# Patient Record
Sex: FEMALE | Race: WHITE | NOT HISPANIC OR LATINO | Employment: OTHER | URBAN - METROPOLITAN AREA
[De-identification: names, ages, dates, MRNs, and addresses within clinical notes are randomized per-mention and may not be internally consistent; named-entity substitution may affect disease eponyms.]

---

## 2018-12-15 ENCOUNTER — APPOINTMENT (OUTPATIENT)
Dept: RADIOLOGY | Facility: MEDICAL CENTER | Age: 73
DRG: 470 | End: 2018-12-15
Attending: EMERGENCY MEDICINE
Payer: MEDICARE

## 2018-12-15 ENCOUNTER — HOSPITAL ENCOUNTER (INPATIENT)
Facility: MEDICAL CENTER | Age: 73
LOS: 11 days | DRG: 470 | End: 2018-12-26
Attending: EMERGENCY MEDICINE | Admitting: HOSPITALIST
Payer: MEDICARE

## 2018-12-15 DIAGNOSIS — Z72.0 TOBACCO ABUSE: ICD-10-CM

## 2018-12-15 DIAGNOSIS — F19.10 POLYSUBSTANCE ABUSE (HCC): ICD-10-CM

## 2018-12-15 DIAGNOSIS — D75.89 MACROCYTOSIS WITHOUT ANEMIA: ICD-10-CM

## 2018-12-15 DIAGNOSIS — F40.01 AGORAPHOBIA WITH PANIC DISORDER: ICD-10-CM

## 2018-12-15 PROBLEM — S72.002A CLOSED FRACTURE OF NECK OF LEFT FEMUR (HCC): Status: ACTIVE | Noted: 2018-12-15

## 2018-12-15 PROBLEM — E66.3 OVERWEIGHT (BMI 25.0-29.9): Status: ACTIVE | Noted: 2018-12-15

## 2018-12-15 LAB
ALBUMIN SERPL BCP-MCNC: 3.9 G/DL (ref 3.2–4.9)
ALBUMIN/GLOB SERPL: 1.3 G/DL
ALP SERPL-CCNC: 120 U/L (ref 30–99)
ALT SERPL-CCNC: 18 U/L (ref 2–50)
ANION GAP SERPL CALC-SCNC: 12 MMOL/L (ref 0–11.9)
APTT PPP: 28.5 SEC (ref 24.7–36)
AST SERPL-CCNC: 26 U/L (ref 12–45)
BASOPHILS # BLD AUTO: 0.2 % (ref 0–1.8)
BASOPHILS # BLD: 0.05 K/UL (ref 0–0.12)
BILIRUB SERPL-MCNC: 1.3 MG/DL (ref 0.1–1.5)
BUN SERPL-MCNC: 15 MG/DL (ref 8–22)
CALCIUM SERPL-MCNC: 9.6 MG/DL (ref 8.5–10.5)
CHLORIDE SERPL-SCNC: 99 MMOL/L (ref 96–112)
CO2 SERPL-SCNC: 26 MMOL/L (ref 20–33)
CREAT SERPL-MCNC: 1.11 MG/DL (ref 0.5–1.4)
EOSINOPHIL # BLD AUTO: 0 K/UL (ref 0–0.51)
EOSINOPHIL NFR BLD: 0 % (ref 0–6.9)
ERYTHROCYTE [DISTWIDTH] IN BLOOD BY AUTOMATED COUNT: 51.8 FL (ref 35.9–50)
GLOBULIN SER CALC-MCNC: 3.1 G/DL (ref 1.9–3.5)
GLUCOSE SERPL-MCNC: 136 MG/DL (ref 65–99)
HCT VFR BLD AUTO: 48.8 % (ref 37–47)
HGB BLD-MCNC: 16.9 G/DL (ref 12–16)
IMM GRANULOCYTES # BLD AUTO: 0.11 K/UL (ref 0–0.11)
IMM GRANULOCYTES NFR BLD AUTO: 0.5 % (ref 0–0.9)
INR PPP: 1.13 (ref 0.87–1.13)
LYMPHOCYTES # BLD AUTO: 0.93 K/UL (ref 1–4.8)
LYMPHOCYTES NFR BLD: 4.6 % (ref 22–41)
MCH RBC QN AUTO: 33.5 PG (ref 27–33)
MCHC RBC AUTO-ENTMCNC: 34.6 G/DL (ref 33.6–35)
MCV RBC AUTO: 96.8 FL (ref 81.4–97.8)
MONOCYTES # BLD AUTO: 1.35 K/UL (ref 0–0.85)
MONOCYTES NFR BLD AUTO: 6.7 % (ref 0–13.4)
NEUTROPHILS # BLD AUTO: 17.58 K/UL (ref 2–7.15)
NEUTROPHILS NFR BLD: 88 % (ref 44–72)
NRBC # BLD AUTO: 0 K/UL
NRBC BLD-RTO: 0 /100 WBC
PLATELET # BLD AUTO: 149 K/UL (ref 164–446)
PMV BLD AUTO: 10.7 FL (ref 9–12.9)
POTASSIUM SERPL-SCNC: 3.3 MMOL/L (ref 3.6–5.5)
PROT SERPL-MCNC: 7 G/DL (ref 6–8.2)
PROTHROMBIN TIME: 14.6 SEC (ref 12–14.6)
RBC # BLD AUTO: 5.04 M/UL (ref 4.2–5.4)
SODIUM SERPL-SCNC: 137 MMOL/L (ref 135–145)
TROPONIN I SERPL-MCNC: 0.01 NG/ML (ref 0–0.04)
WBC # BLD AUTO: 20 K/UL (ref 4.8–10.8)

## 2018-12-15 PROCEDURE — 84484 ASSAY OF TROPONIN QUANT: CPT

## 2018-12-15 PROCEDURE — 73551 X-RAY EXAM OF FEMUR 1: CPT | Mod: LT

## 2018-12-15 PROCEDURE — 96375 TX/PRO/DX INJ NEW DRUG ADDON: CPT

## 2018-12-15 PROCEDURE — 96374 THER/PROPH/DIAG INJ IV PUSH: CPT

## 2018-12-15 PROCEDURE — 71045 X-RAY EXAM CHEST 1 VIEW: CPT

## 2018-12-15 PROCEDURE — 85730 THROMBOPLASTIN TIME PARTIAL: CPT

## 2018-12-15 PROCEDURE — 85610 PROTHROMBIN TIME: CPT

## 2018-12-15 PROCEDURE — 700105 HCHG RX REV CODE 258: Performed by: EMERGENCY MEDICINE

## 2018-12-15 PROCEDURE — 72170 X-RAY EXAM OF PELVIS: CPT

## 2018-12-15 PROCEDURE — 99223 1ST HOSP IP/OBS HIGH 75: CPT | Mod: AI | Performed by: HOSPITALIST

## 2018-12-15 PROCEDURE — 99285 EMERGENCY DEPT VISIT HI MDM: CPT

## 2018-12-15 PROCEDURE — 700111 HCHG RX REV CODE 636 W/ 250 OVERRIDE (IP): Performed by: EMERGENCY MEDICINE

## 2018-12-15 PROCEDURE — 770001 HCHG ROOM/CARE - MED/SURG/GYN PRIV*

## 2018-12-15 PROCEDURE — 85025 COMPLETE CBC W/AUTO DIFF WBC: CPT

## 2018-12-15 PROCEDURE — 93005 ELECTROCARDIOGRAM TRACING: CPT | Performed by: EMERGENCY MEDICINE

## 2018-12-15 PROCEDURE — 80053 COMPREHEN METABOLIC PANEL: CPT

## 2018-12-15 RX ORDER — VALSARTAN 80 MG/1
160 TABLET ORAL DAILY
Status: DISCONTINUED | OUTPATIENT
Start: 2018-12-16 | End: 2018-12-26 | Stop reason: HOSPADM

## 2018-12-15 RX ORDER — ACETAMINOPHEN 325 MG/1
650 TABLET ORAL EVERY 6 HOURS PRN
Status: DISCONTINUED | OUTPATIENT
Start: 2018-12-15 | End: 2018-12-20

## 2018-12-15 RX ORDER — MORPHINE SULFATE 10 MG/ML
4 INJECTION, SOLUTION INTRAMUSCULAR; INTRAVENOUS ONCE
Status: COMPLETED | OUTPATIENT
Start: 2018-12-15 | End: 2018-12-15

## 2018-12-15 RX ORDER — SODIUM CHLORIDE 9 MG/ML
INJECTION, SOLUTION INTRAVENOUS CONTINUOUS
Status: DISCONTINUED | OUTPATIENT
Start: 2018-12-15 | End: 2018-12-18

## 2018-12-15 RX ORDER — ONDANSETRON 2 MG/ML
4 INJECTION INTRAMUSCULAR; INTRAVENOUS ONCE
Status: COMPLETED | OUTPATIENT
Start: 2018-12-15 | End: 2018-12-15

## 2018-12-15 RX ORDER — ONDANSETRON 2 MG/ML
4 INJECTION INTRAMUSCULAR; INTRAVENOUS EVERY 4 HOURS PRN
Status: DISCONTINUED | OUTPATIENT
Start: 2018-12-15 | End: 2018-12-26 | Stop reason: HOSPADM

## 2018-12-15 RX ORDER — BUPROPION HYDROCHLORIDE 75 MG/1
75 TABLET ORAL DAILY
Status: DISCONTINUED | OUTPATIENT
Start: 2018-12-16 | End: 2018-12-26 | Stop reason: HOSPADM

## 2018-12-15 RX ORDER — TIOTROPIUM BROMIDE 18 UG/1
1 CAPSULE ORAL; RESPIRATORY (INHALATION) DAILY
Status: DISCONTINUED | OUTPATIENT
Start: 2018-12-16 | End: 2018-12-26 | Stop reason: HOSPADM

## 2018-12-15 RX ORDER — LEVOTHYROXINE SODIUM 137 UG/1
137 TABLET ORAL DAILY
Status: DISCONTINUED | OUTPATIENT
Start: 2018-12-16 | End: 2018-12-26 | Stop reason: HOSPADM

## 2018-12-15 RX ORDER — OXYCODONE HYDROCHLORIDE 5 MG/1
2.5 TABLET ORAL
Status: DISCONTINUED | OUTPATIENT
Start: 2018-12-15 | End: 2018-12-18

## 2018-12-15 RX ORDER — POLYETHYLENE GLYCOL 3350 17 G/17G
1 POWDER, FOR SOLUTION ORAL
Status: DISCONTINUED | OUTPATIENT
Start: 2018-12-15 | End: 2018-12-16

## 2018-12-15 RX ORDER — GABAPENTIN 300 MG/1
300 CAPSULE ORAL 3 TIMES DAILY
Status: DISCONTINUED | OUTPATIENT
Start: 2018-12-15 | End: 2018-12-20

## 2018-12-15 RX ORDER — SPIRONOLACTONE 25 MG/1
100 TABLET ORAL DAILY
Status: DISCONTINUED | OUTPATIENT
Start: 2018-12-16 | End: 2018-12-26 | Stop reason: HOSPADM

## 2018-12-15 RX ORDER — OXYCODONE HYDROCHLORIDE 5 MG/1
5 TABLET ORAL
Status: DISCONTINUED | OUTPATIENT
Start: 2018-12-15 | End: 2018-12-18

## 2018-12-15 RX ORDER — ALLOPURINOL 300 MG/1
300 TABLET ORAL DAILY
Status: DISCONTINUED | OUTPATIENT
Start: 2018-12-16 | End: 2018-12-26 | Stop reason: HOSPADM

## 2018-12-15 RX ORDER — BISACODYL 10 MG
10 SUPPOSITORY, RECTAL RECTAL
Status: DISCONTINUED | OUTPATIENT
Start: 2018-12-15 | End: 2018-12-26 | Stop reason: HOSPADM

## 2018-12-15 RX ORDER — MORPHINE SULFATE 10 MG/ML
2 INJECTION, SOLUTION INTRAMUSCULAR; INTRAVENOUS
Status: DISCONTINUED | OUTPATIENT
Start: 2018-12-15 | End: 2018-12-18

## 2018-12-15 RX ORDER — SODIUM CHLORIDE 9 MG/ML
1000 INJECTION, SOLUTION INTRAVENOUS ONCE
Status: COMPLETED | OUTPATIENT
Start: 2018-12-15 | End: 2018-12-15

## 2018-12-15 RX ORDER — AMOXICILLIN 250 MG
2 CAPSULE ORAL 2 TIMES DAILY
Status: DISCONTINUED | OUTPATIENT
Start: 2018-12-15 | End: 2018-12-26 | Stop reason: HOSPADM

## 2018-12-15 RX ORDER — ONDANSETRON 4 MG/1
4 TABLET, ORALLY DISINTEGRATING ORAL EVERY 4 HOURS PRN
Status: DISCONTINUED | OUTPATIENT
Start: 2018-12-15 | End: 2018-12-26 | Stop reason: HOSPADM

## 2018-12-15 RX ADMIN — ONDANSETRON 4 MG: 2 INJECTION INTRAMUSCULAR; INTRAVENOUS at 20:56

## 2018-12-15 RX ADMIN — SODIUM CHLORIDE 1000 ML: 9 INJECTION, SOLUTION INTRAVENOUS at 22:15

## 2018-12-15 RX ADMIN — MORPHINE SULFATE 4 MG: 10 INJECTION INTRAVENOUS at 20:55

## 2018-12-15 ASSESSMENT — PAIN SCALES - GENERAL
PAINLEVEL_OUTOF10: 8
PAINLEVEL_OUTOF10: 8
PAINLEVEL_OUTOF10: 3

## 2018-12-15 ASSESSMENT — ENCOUNTER SYMPTOMS
PSYCHIATRIC NEGATIVE: 1
NEUROLOGICAL NEGATIVE: 1
RESPIRATORY NEGATIVE: 1
FALLS: 1
CARDIOVASCULAR NEGATIVE: 1
EYES NEGATIVE: 1
CONSTITUTIONAL NEGATIVE: 1
GASTROINTESTINAL NEGATIVE: 1

## 2018-12-16 ENCOUNTER — APPOINTMENT (OUTPATIENT)
Dept: RADIOLOGY | Facility: MEDICAL CENTER | Age: 73
DRG: 470 | End: 2018-12-16
Attending: ORTHOPAEDIC SURGERY
Payer: MEDICARE

## 2018-12-16 PROBLEM — E87.6 HYPOKALEMIA: Status: ACTIVE | Noted: 2018-12-16

## 2018-12-16 PROBLEM — S05.01XA CORNEAL ABRASION, RIGHT: Status: ACTIVE | Noted: 2018-12-16

## 2018-12-16 LAB — EKG IMPRESSION: NORMAL

## 2018-12-16 PROCEDURE — 160002 HCHG RECOVERY MINUTES (STAT): Performed by: ORTHOPAEDIC SURGERY

## 2018-12-16 PROCEDURE — 700105 HCHG RX REV CODE 258: Performed by: HOSPITALIST

## 2018-12-16 PROCEDURE — 160035 HCHG PACU - 1ST 60 MINS PHASE I: Performed by: ORTHOPAEDIC SURGERY

## 2018-12-16 PROCEDURE — 502000 HCHG MISC OR IMPLANTS RC 0278: Performed by: ORTHOPAEDIC SURGERY

## 2018-12-16 PROCEDURE — 700111 HCHG RX REV CODE 636 W/ 250 OVERRIDE (IP): Performed by: ANESTHESIOLOGY

## 2018-12-16 PROCEDURE — A9270 NON-COVERED ITEM OR SERVICE: HCPCS | Performed by: HOSPITALIST

## 2018-12-16 PROCEDURE — 700111 HCHG RX REV CODE 636 W/ 250 OVERRIDE (IP): Performed by: ORTHOPAEDIC SURGERY

## 2018-12-16 PROCEDURE — 99233 SBSQ HOSP IP/OBS HIGH 50: CPT | Performed by: HOSPITALIST

## 2018-12-16 PROCEDURE — 770001 HCHG ROOM/CARE - MED/SURG/GYN PRIV*

## 2018-12-16 PROCEDURE — 160048 HCHG OR STATISTICAL LEVEL 1-5: Performed by: ORTHOPAEDIC SURGERY

## 2018-12-16 PROCEDURE — 160036 HCHG PACU - EA ADDL 30 MINS PHASE I: Performed by: ORTHOPAEDIC SURGERY

## 2018-12-16 PROCEDURE — 700111 HCHG RX REV CODE 636 W/ 250 OVERRIDE (IP)

## 2018-12-16 PROCEDURE — 700111 HCHG RX REV CODE 636 W/ 250 OVERRIDE (IP): Performed by: HOSPITALIST

## 2018-12-16 PROCEDURE — 0SRS01A REPLACEMENT OF LEFT HIP JOINT, FEMORAL SURFACE WITH METAL SYNTHETIC SUBSTITUTE, UNCEMENTED, OPEN APPROACH: ICD-10-PCS | Performed by: ORTHOPAEDIC SURGERY

## 2018-12-16 PROCEDURE — C1776 JOINT DEVICE (IMPLANTABLE): HCPCS | Performed by: ORTHOPAEDIC SURGERY

## 2018-12-16 PROCEDURE — 36415 COLL VENOUS BLD VENIPUNCTURE: CPT

## 2018-12-16 PROCEDURE — A4338 INDWELLING CATHETER LATEX: HCPCS | Performed by: ORTHOPAEDIC SURGERY

## 2018-12-16 PROCEDURE — 160009 HCHG ANES TIME/MIN: Performed by: ORTHOPAEDIC SURGERY

## 2018-12-16 PROCEDURE — 51798 US URINE CAPACITY MEASURE: CPT

## 2018-12-16 PROCEDURE — 700102 HCHG RX REV CODE 250 W/ 637 OVERRIDE(OP): Performed by: HOSPITALIST

## 2018-12-16 PROCEDURE — 501838 HCHG SUTURE GENERAL: Performed by: ORTHOPAEDIC SURGERY

## 2018-12-16 PROCEDURE — 83036 HEMOGLOBIN GLYCOSYLATED A1C: CPT

## 2018-12-16 PROCEDURE — 160042 HCHG SURGERY MINUTES - EA ADDL 1 MIN LEVEL 5: Performed by: ORTHOPAEDIC SURGERY

## 2018-12-16 PROCEDURE — 72170 X-RAY EXAM OF PELVIS: CPT

## 2018-12-16 PROCEDURE — 160031 HCHG SURGERY MINUTES - 1ST 30 MINS LEVEL 5: Performed by: ORTHOPAEDIC SURGERY

## 2018-12-16 PROCEDURE — 700101 HCHG RX REV CODE 250

## 2018-12-16 PROCEDURE — 502578 HCHG PACK, TOTAL HIP: Performed by: ORTHOPAEDIC SURGERY

## 2018-12-16 DEVICE — IMPLANTABLE DEVICE: Type: IMPLANTABLE DEVICE | Site: HIP | Status: FUNCTIONAL

## 2018-12-16 DEVICE — IMPLANT COCR 12/14 FEM HEAD 28 -3: Type: IMPLANTABLE DEVICE | Site: HIP | Status: FUNCTIONAL

## 2018-12-16 DEVICE — IMPLANT TANDEM BIPOLAR COCR 45OD 28ID: Type: IMPLANTABLE DEVICE | Site: HIP | Status: FUNCTIONAL

## 2018-12-16 RX ORDER — HYDROMORPHONE HYDROCHLORIDE 1 MG/ML
0.1 INJECTION, SOLUTION INTRAMUSCULAR; INTRAVENOUS; SUBCUTANEOUS
Status: DISCONTINUED | OUTPATIENT
Start: 2018-12-16 | End: 2018-12-16 | Stop reason: HOSPADM

## 2018-12-16 RX ORDER — HALOPERIDOL 5 MG/ML
1 INJECTION INTRAMUSCULAR
Status: DISCONTINUED | OUTPATIENT
Start: 2018-12-16 | End: 2018-12-16 | Stop reason: HOSPADM

## 2018-12-16 RX ORDER — OXYCODONE HCL 5 MG/5 ML
10 SOLUTION, ORAL ORAL
Status: DISCONTINUED | OUTPATIENT
Start: 2018-12-16 | End: 2018-12-16 | Stop reason: HOSPADM

## 2018-12-16 RX ORDER — IPRATROPIUM BROMIDE AND ALBUTEROL SULFATE 2.5; .5 MG/3ML; MG/3ML
3 SOLUTION RESPIRATORY (INHALATION)
Status: DISCONTINUED | OUTPATIENT
Start: 2018-12-16 | End: 2018-12-16 | Stop reason: HOSPADM

## 2018-12-16 RX ORDER — DEXAMETHASONE SODIUM PHOSPHATE 4 MG/ML
4 INJECTION, SOLUTION INTRA-ARTICULAR; INTRALESIONAL; INTRAMUSCULAR; INTRAVENOUS; SOFT TISSUE
Status: DISCONTINUED | OUTPATIENT
Start: 2018-12-16 | End: 2018-12-20

## 2018-12-16 RX ORDER — DIPHENHYDRAMINE HYDROCHLORIDE 50 MG/ML
12.5 INJECTION INTRAMUSCULAR; INTRAVENOUS
Status: DISCONTINUED | OUTPATIENT
Start: 2018-12-16 | End: 2018-12-16 | Stop reason: HOSPADM

## 2018-12-16 RX ORDER — SODIUM CHLORIDE, SODIUM LACTATE, POTASSIUM CHLORIDE, CALCIUM CHLORIDE 600; 310; 30; 20 MG/100ML; MG/100ML; MG/100ML; MG/100ML
INJECTION, SOLUTION INTRAVENOUS CONTINUOUS
Status: DISCONTINUED | OUTPATIENT
Start: 2018-12-16 | End: 2018-12-16 | Stop reason: HOSPADM

## 2018-12-16 RX ORDER — ONDANSETRON 2 MG/ML
4 INJECTION INTRAMUSCULAR; INTRAVENOUS
Status: DISCONTINUED | OUTPATIENT
Start: 2018-12-16 | End: 2018-12-16 | Stop reason: HOSPADM

## 2018-12-16 RX ORDER — HYDROMORPHONE HYDROCHLORIDE 1 MG/ML
0.2 INJECTION, SOLUTION INTRAMUSCULAR; INTRAVENOUS; SUBCUTANEOUS
Status: DISCONTINUED | OUTPATIENT
Start: 2018-12-16 | End: 2018-12-16 | Stop reason: HOSPADM

## 2018-12-16 RX ORDER — CEFAZOLIN SODIUM 2 G/100ML
2 INJECTION, SOLUTION INTRAVENOUS EVERY 8 HOURS
Status: COMPLETED | OUTPATIENT
Start: 2018-12-16 | End: 2018-12-17

## 2018-12-16 RX ORDER — MEPERIDINE HYDROCHLORIDE 25 MG/ML
12.5 INJECTION INTRAMUSCULAR; INTRAVENOUS; SUBCUTANEOUS
Status: DISCONTINUED | OUTPATIENT
Start: 2018-12-16 | End: 2018-12-16 | Stop reason: HOSPADM

## 2018-12-16 RX ORDER — HYDROMORPHONE HYDROCHLORIDE 1 MG/ML
0.4 INJECTION, SOLUTION INTRAMUSCULAR; INTRAVENOUS; SUBCUTANEOUS
Status: DISCONTINUED | OUTPATIENT
Start: 2018-12-16 | End: 2018-12-16 | Stop reason: HOSPADM

## 2018-12-16 RX ORDER — OXYCODONE HCL 5 MG/5 ML
5 SOLUTION, ORAL ORAL
Status: DISCONTINUED | OUTPATIENT
Start: 2018-12-16 | End: 2018-12-16 | Stop reason: HOSPADM

## 2018-12-16 RX ORDER — POTASSIUM CHLORIDE 20 MEQ/1
40 TABLET, EXTENDED RELEASE ORAL ONCE
Status: COMPLETED | OUTPATIENT
Start: 2018-12-16 | End: 2018-12-16

## 2018-12-16 RX ORDER — POLYETHYLENE GLYCOL 3350 17 G/17G
1 POWDER, FOR SOLUTION ORAL 2 TIMES DAILY PRN
Status: DISCONTINUED | OUTPATIENT
Start: 2018-12-16 | End: 2018-12-26 | Stop reason: HOSPADM

## 2018-12-16 RX ADMIN — POTASSIUM CHLORIDE 40 MEQ: 1500 TABLET, EXTENDED RELEASE ORAL at 16:10

## 2018-12-16 RX ADMIN — LEVOTHYROXINE SODIUM 137 MCG: 137 TABLET ORAL at 13:07

## 2018-12-16 RX ADMIN — FENTANYL CITRATE 50 MCG: 50 INJECTION, SOLUTION INTRAMUSCULAR; INTRAVENOUS at 10:05

## 2018-12-16 RX ADMIN — FENTANYL CITRATE 50 MCG: 50 INJECTION, SOLUTION INTRAMUSCULAR; INTRAVENOUS at 10:15

## 2018-12-16 RX ADMIN — GABAPENTIN 300 MG: 300 CAPSULE ORAL at 16:10

## 2018-12-16 RX ADMIN — SODIUM CHLORIDE: 9 INJECTION, SOLUTION INTRAVENOUS at 16:10

## 2018-12-16 RX ADMIN — STANDARDIZED SENNA CONCENTRATE AND DOCUSATE SODIUM 2 TABLET: 8.6; 5 TABLET, FILM COATED ORAL at 16:11

## 2018-12-16 RX ADMIN — OXYCODONE HYDROCHLORIDE 5 MG: 5 TABLET ORAL at 00:21

## 2018-12-16 RX ADMIN — OXYCODONE HYDROCHLORIDE 5 MG: 5 TABLET ORAL at 13:07

## 2018-12-16 RX ADMIN — SODIUM CHLORIDE: 9 INJECTION, SOLUTION INTRAVENOUS at 00:21

## 2018-12-16 RX ADMIN — CEFAZOLIN SODIUM 2 G: 2 INJECTION, SOLUTION INTRAVENOUS at 16:10

## 2018-12-16 RX ADMIN — TIOTROPIUM BROMIDE 1 CAPSULE: 18 CAPSULE ORAL; RESPIRATORY (INHALATION) at 06:02

## 2018-12-16 RX ADMIN — ENOXAPARIN SODIUM 40 MG: 100 INJECTION SUBCUTANEOUS at 21:12

## 2018-12-16 RX ADMIN — OXYCODONE HYDROCHLORIDE 5 MG: 5 TABLET ORAL at 20:46

## 2018-12-16 RX ADMIN — MORPHINE SULFATE 2 MG: 10 INJECTION INTRAVENOUS at 06:02

## 2018-12-16 ASSESSMENT — PAIN SCALES - GENERAL
PAINLEVEL_OUTOF10: 8
PAINLEVEL_OUTOF10: 0
PAINLEVEL_OUTOF10: ASSUMED PAIN PRESENT
PAINLEVEL_OUTOF10: 5
PAINLEVEL_OUTOF10: ASSUMED PAIN PRESENT
PAINLEVEL_OUTOF10: 7
PAINLEVEL_OUTOF10: ASSUMED PAIN PRESENT
PAINLEVEL_OUTOF10: 8
PAINLEVEL_OUTOF10: ASSUMED PAIN PRESENT
PAINLEVEL_OUTOF10: 0
PAINLEVEL_OUTOF10: 9

## 2018-12-16 ASSESSMENT — LIFESTYLE VARIABLES
ON A TYPICAL DAY WHEN YOU DRINK ALCOHOL HOW MANY DRINKS DO YOU HAVE: 1
EVER_SMOKED: YES
PACK_YEARS: 35
TOTAL SCORE: 0
HAVE YOU EVER FELT YOU SHOULD CUT DOWN ON YOUR DRINKING: NO
HAVE PEOPLE ANNOYED YOU BY CRITICIZING YOUR DRINKING: NO
HOW MANY TIMES IN THE PAST YEAR HAVE YOU HAD 5 OR MORE DRINKS IN A DAY: 0
TOTAL SCORE: 0
TOTAL SCORE: 0
ALCOHOL_USE: YES
EVER HAD A DRINK FIRST THING IN THE MORNING TO STEADY YOUR NERVES TO GET RID OF A HANGOVER: NO
CONSUMPTION TOTAL: NEGATIVE
EVER FELT BAD OR GUILTY ABOUT YOUR DRINKING: NO
AVERAGE NUMBER OF DAYS PER WEEK YOU HAVE A DRINK CONTAINING ALCOHOL: 4

## 2018-12-16 ASSESSMENT — PATIENT HEALTH QUESTIONNAIRE - PHQ9
2. FEELING DOWN, DEPRESSED, IRRITABLE, OR HOPELESS: NOT AT ALL
1. LITTLE INTEREST OR PLEASURE IN DOING THINGS: NOT AT ALL
SUM OF ALL RESPONSES TO PHQ9 QUESTIONS 1 AND 2: 0

## 2018-12-16 ASSESSMENT — ENCOUNTER SYMPTOMS
WHEEZING: 0
HEADACHES: 0
EYE REDNESS: 1
DIARRHEA: 0
COUGH: 0
VOMITING: 0
EYE PAIN: 1
CHILLS: 0
CONSTIPATION: 0
NAUSEA: 0
FEVER: 0
SHORTNESS OF BREATH: 0

## 2018-12-16 ASSESSMENT — COGNITIVE AND FUNCTIONAL STATUS - GENERAL
MOVING TO AND FROM BED TO CHAIR: A LITTLE
SUGGESTED CMS G CODE MODIFIER MOBILITY: CK
DAILY ACTIVITIY SCORE: 21
DRESSING REGULAR LOWER BODY CLOTHING: A LITTLE
CLIMB 3 TO 5 STEPS WITH RAILING: A LOT
WALKING IN HOSPITAL ROOM: A LITTLE
MOBILITY SCORE: 17
TURNING FROM BACK TO SIDE WHILE IN FLAT BAD: A LITTLE
SUGGESTED CMS G CODE MODIFIER DAILY ACTIVITY: CJ
HELP NEEDED FOR BATHING: A LITTLE
TOILETING: A LITTLE
STANDING UP FROM CHAIR USING ARMS: A LITTLE
MOVING FROM LYING ON BACK TO SITTING ON SIDE OF FLAT BED: A LITTLE

## 2018-12-16 NOTE — H&P
Hospital Medicine History & Physical Note    Date of Service  12/15/2018    Primary Care Physician  Miller Kunz M.D.    Consultants  None    Code Status  Full code     Chief Complaint  Fall, hip pain    History of Presenting Illness  73 y.o. female with history of dyslipidemia which is controlled, essential hypertension which is controlled, and prior imaging consistent with cirrhosis without end-stage liver disease was in her usual state of health until the day of admission.  She was walking, when she stumbled, falling on her left side.  She subsequently had severe left hip pain.  This is made worse by any attempted ambulation or movement.  Somewhat resolved with pain medication in the emergency department.  She currently denies any other complaints.  She had no syncope.  No lightheadedness.    Review of Systems  Review of Systems   Constitutional: Negative.    HENT: Negative.    Eyes: Negative.    Respiratory: Negative.    Cardiovascular: Negative.    Gastrointestinal: Negative.    Genitourinary: Negative.    Musculoskeletal: Positive for falls and joint pain.   Skin: Negative.    Neurological: Negative.    Endo/Heme/Allergies: Negative.    Psychiatric/Behavioral: Negative.        Past Medical History   has a past medical history of Acute respiratory failure ([facility]) (September 2011); Agoraphobia; Agoraphobia with panic disorder; Anesthesia; Arthritis; Depression ( ); Fall; Gout; Gouty arthropathy, unspecified; Hyperlipidemia; Hypertension; Hypothyroidism; Lightheadedness; Other chronic pulmonary heart diseases; Overweight, obesity and other hyperalimentation; Pain; Pulmonary hypertension ([facility]); PVD (peripheral vascular disease) ([facility]); and Unspecified hypothyroidism. She also has no past medical history of Breast cancer ([facility]).    Surgical History   has a past surgical history that includes lumbar fusion anterior (2006); lumbar laminectomy diskectomy (1996); ganglion excision (1987); carotid endarterectomy  "(2010); and fusion.     Family History  family history includes Heart Disease in her father and mother.     Social History   reports that she has been smoking Cigarettes.  She has been smoking about 0.25 packs per day. She has never used smokeless tobacco. She reports that she drinks alcohol. She reports that she does not use drugs.    Allergies  Allergies   Allergen Reactions   • Amoxicillin    • Amoxicillin      unsure   • Metoprolol      Pt states she \"\" when she took it.   • Statins [Hmg-Coa-R Inhibitors]      Severe myalgias and elevated CPK.   • Statins [Hmg-Coa-R Inhibitors]      Extreme weakness and falls   • Tricor      Severe myalgias and elevated CPK.       Medications  Prior to Admission Medications   Prescriptions Last Dose Informant Patient Reported? Taking?   Cholecalciferol (VITAMIN D) 2000 UNIT TABS   Yes No   Sig: Take 6,000 Units by mouth every day with lunch.   Coenzyme Q10 (CO Q-10) 200 MG CAPS   Yes No   Sig: Take 1 Cap by mouth every day.   Coenzyme Q10 200 MG CAPS   Yes No   Sig: Take 1 Cap by mouth every day with lunch.   Coral Calcium 1000 (390 CA) MG TABS   Yes No   Sig: Take 2 Tabs by mouth every day.   Coral Calcium 1000 (390 CA) MG TABS   Yes No   Sig: Take 2 Tabs by mouth every bedtime.   Cyanocobalamin (B-12) 2500 MCG TABS   Yes No   Sig: Take 1 Tab by mouth every day.   Diphenhydramine-APAP, sleep, (EXCEDRIN PM PO)   Yes No   Sig: Take 1 Tab by mouth every bedtime.   Diphenhydramine-APAP, sleep, (TYLENOL PM EXTRA STRENGTH)  MG TABS   Yes No   Sig: Take 1 Tab by mouth every bedtime.   Ibuprofen 200 MG CAPS   Yes No   Sig: Take 600 mg by mouth 3 times a day. Takes 3 pills   Multiple Vitamins-Minerals (OCUVITE) TABS   Yes No   Sig: Take 1 Tab by mouth every day with lunch.   Multiple Vitamins-Minerals (OCUVITE-LUTEIN) CAPS   Yes No   Sig: Take 1 Cap by mouth every day.   Non Formulary Request   Yes No   Si each of Juice Plus Garden Blend; Oniel Blend; Orchard Blend "   Non Formulary Request   Yes No   Sig: Take 2 Caps by mouth every day with lunch. Indications: JUICE PLUS GARDEN BLEND   Non Formulary Request   Yes No   Sig: Take 2 Caps by mouth every day with lunch. Indications: JUICE PLUS PRAVIN   Non Formulary Request   Yes No   Sig: Take 2 Caps by mouth every day with lunch. Indications: jucie plus orchard   Potassium Gluconate 550 MG TABS   Yes No   Sig: Take 1 Tab by mouth every day.   VITAMIN D PO   Yes No   Sig: Take 6,000 mg by mouth every day.   allopurinol (ZYLOPRIM) 300 MG TABS   Yes No   Sig: Take 300 mg by mouth every day.   allopurinol (ZYLOPRIM) 300 MG TABS   Yes No   Sig: Take 300 mg by mouth every day with lunch.   alprazolam (XANAX) 0.5 MG TABS   Yes No   Sig: Take 0.75 mg by mouth every bedtime. And 0.25 mg if needed   alprazolam (XANAX) 0.5 MG TABS   Yes No   Sig: Take 0.75 mg by mouth every bedtime. Indications: Trouble Sleeping   aspirin 81 MG tablet   Yes No   Sig: Take 162 mg by mouth every day.   aspirin EC (ECOTRIN) 81 MG TBEC   Yes No   Sig: Take 162 mg by mouth every bedtime.   buPROPion (WELLBUTRIN) 75 MG TABS   Yes No   Sig: Take 75 mg by mouth every day.   buPROPion (WELLBUTRIN) 75 MG TABS   Yes No   Sig: Take 75 mg by mouth every day with lunch.   colchicine (COLCRYS) 0.6 MG TABS   Yes No   Sig: Take 1.2 mg by mouth every day with lunch.   cyanocobalamin (VITAMIN B-12) 500 MCG TABS   Yes No   Sig: Take 2,500 mcg by mouth every day with lunch.   furosemide (LASIX) 40 MG TABS   No No   Sig: Take 1 Tab by mouth every day.   gabapentin (NEURONTIN) 300 MG CAPS   Yes No   Sig: Take 300 mg by mouth 3 times a day.   gabapentin (NEURONTIN) 300 MG CAPS   Yes No   Sig: Take 300 mg by mouth 3 times a day.   lactulose 20 GM/30ML SOLN   No No   Sig: Take 30 mL by mouth 3 times a day.   levothyroxine (SYNTHROID) 137 MCG TABS   Yes No   Sig: Take 137 mcg by mouth every day.   levothyroxine (SYNTHROID) 137 MCG TABS   Yes No   Sig: Take 137 mcg by mouth every day  with lunch.   magnesium hydroxide (MILK OF MAGNESIA) 400 MG/5ML SUSP   No No   Sig: Take 30 mL by mouth 2 times a day as needed.   mupirocin (BACTROBAN) 2 % OINT   No No   Sig: Apply to affected area(s) every day.   spironolactone (ALDACTONE) 100 MG TABS   No No   Sig: Take 1 Tab by mouth every day.   sulfamethoxazole-trimethoprim (BACTRIM DS,SEPTRA DS) 800-160 MG TABS   No No   Sig: Take 1 Tab by mouth every 12 hours.   tiotropium (SPIRIVA) 18 MCG CAPS   No No   Sig: Inhale 1 Cap by mouth every day.   tramadol (ULTRAM) 50 MG TABS   No No   Sig: Take 1 Tab by mouth every 6 hours as needed (back pain).   valsartan (DIOVAN) 320 MG tablet   No No   Sig: Take 0.5 Tabs by mouth every day.      Facility-Administered Medications: None       Physical Exam  Temp:  [36.4 °C (97.5 °F)] 36.4 °C (97.5 °F)  Pulse:  [68-80] 71  Resp:  [17-21] 17  BP: (178-204)/(94-95) 178/95    Physical Exam   Constitutional: She is oriented to person, place, and time. She appears well-developed and well-nourished. No distress.   HENT:   Head: Normocephalic and atraumatic.   Eyes: Pupils are equal, round, and reactive to light.   Neck: Normal range of motion. Neck supple. No tracheal deviation present. No thyromegaly present.   Cardiovascular: Normal rate, regular rhythm and normal heart sounds.  Exam reveals no gallop and no friction rub.    No murmur heard.  Pulmonary/Chest: Effort normal and breath sounds normal. No respiratory distress. She has no wheezes. She has no rales.   Abdominal: Soft. Bowel sounds are normal. She exhibits no distension. There is no tenderness. There is no rebound.   Musculoskeletal: Normal range of motion. She exhibits no edema.   Lymphadenopathy:     She has no cervical adenopathy.   Neurological: She is alert and oriented to person, place, and time. No cranial nerve deficit.   Skin: Skin is warm and dry. She is not diaphoretic.   Psychiatric: She has a normal mood and affect.   Nursing note and vitals  reviewed.      Laboratory:  Recent Labs      12/15/18   2050   WBC  20.0*   RBC  5.04   HEMOGLOBIN  16.9*   HEMATOCRIT  48.8*   MCV  96.8   MCH  33.5*   MCHC  34.6   RDW  51.8*   PLATELETCT  149*   MPV  10.7     Recent Labs      12/15/18   2050   SODIUM  137   POTASSIUM  3.3*   CHLORIDE  99   CO2  26   GLUCOSE  136*   BUN  15   CREATININE  1.11   CALCIUM  9.6     Recent Labs      12/15/18   2050   ALTSGPT  18   ASTSGOT  26   ALKPHOSPHAT  120*   TBILIRUBIN  1.3   GLUCOSE  136*     Recent Labs      12/15/18   2050   APTT  28.5   INR  1.13             Recent Labs      12/15/18   2050   TROPONINI  0.01       Urinalysis:    No results found     Imaging:  DX-PELVIS-1 OR 2 VIEWS   Final Result      1.  LEFT femoral neck fracture with displacement and varus stimulation.   2.  No pelvic fracture demonstrated.      DX-FEMUR-1 VIEW LEFT   Final Result      Acute displaced fracture of the LEFT femoral neck with varus angulation.      DX-CHEST-LIMITED (1 VIEW)   Final Result      No acute cardiopulmonary disease.            Assessment/Plan:  I anticipate this patient will require at least two midnights for appropriate medical management, necessitating inpatient admission.    * Closed fracture of neck of left femur (HCC)   Assessment & Plan    Plan for pain management, orthopedic evaluation Dr. Alcala      Essential hypertension- (present on admission)   Assessment & Plan    Controlled with current medication regimen.  Monitor.      Overweight (BMI 25.0-29.9)   Assessment & Plan    Body mass index is 29.76 kg/m².       Cirrhosis (HCC)- (present on admission)   Assessment & Plan    Without evidence of end-stage disease.  Unclear etiology.  Outpatient follow-up          VTE prophylaxis: SCD

## 2018-12-16 NOTE — ED TRIAGE NOTES
Pt. Arrives via EMS with C/O left sided hip pain after falling today around 1230. Pt. Reports tripping and falling and was not able to stand up on her own. EMS states that the pt. Was seen by her PCP after having a fall last week. Pt. Denies LOC but states she did head her head. Pt. States she is on no blood thinners as well. Hx. Of A.fib and anxiety.

## 2018-12-16 NOTE — PROGRESS NOTES
[facility] Medicine Daily Progress Note    Date of Service  12/16/2018    Chief Complaint  73 y.o. female admitted 12/15/2018 with left femoral neck fracture    Interval Problem Update  12/16: OR repair of femoral neck fracture.  Patient developed corneal abrasion.  Trying warm compresses and avoiding eyedrops for now.  Repleting potassium    Disposition  PT OT possibly may need placement    Review of Systems  Review of Systems   Constitutional: Negative for chills and fever.   Eyes: Positive for pain and redness.   Respiratory: Negative for cough, shortness of breath and wheezing.    Cardiovascular: Negative for chest pain.   Gastrointestinal: Negative for constipation, diarrhea, nausea and vomiting.   Genitourinary: Negative for dysuria.   Musculoskeletal: Positive for joint pain.   Neurological: Negative for headaches.        Physical Exam  Temp:  [36.1 °C (96.9 °F)-37.2 °C (98.9 °F)] 36.4 °C (97.6 °F)  Pulse:  [57-86] 76  Resp:  [12-21] 16  BP: ()/(30-95) 134/83    Physical Exam   Constitutional: She appears well-developed.   HENT:   Head: Normocephalic.   Eyes:   Right eye tenderness with conjunctival injection, no foreign body seen grossly   Cardiovascular: Normal rate.  Exam reveals no gallop.    Pulmonary/Chest: No respiratory distress. She has no wheezes.   Abdominal: She exhibits no distension. There is no tenderness.   Neurological: She is alert.       Fluids    Intake/Output Summary (Last 24 hours) at 12/16/18 1307  Last data filed at 12/16/18 1100   Gross per 24 hour   Intake             1650 ml   Output             1350 ml   Net              300 ml       Laboratory  Recent Labs      12/15/18   2050   WBC  20.0*   RBC  5.04   HEMOGLOBIN  16.9*   HEMATOCRIT  48.8*   MCV  96.8   MCH  33.5*   MCHC  34.6   RDW  51.8*   PLATELETCT  149*   MPV  10.7     Recent Labs      12/15/18   2050   SODIUM  137   POTASSIUM  3.3*   CHLORIDE  99   CO2  26   GLUCOSE  136*   BUN  15   CREATININE  1.11   CALCIUM  9.6      Recent Labs      12/15/18   2050   APTT  28.5   INR  1.13               Imaging  DX-PELVIS-1 OR 2 VIEWS   Final Result      Satisfactory positioning, in AP view, of new left hip hemiarthroplasty.      DX-PELVIS-1 OR 2 VIEWS   Final Result      1.  LEFT femoral neck fracture with displacement and varus stimulation.   2.  No pelvic fracture demonstrated.      DX-FEMUR-1 VIEW LEFT   Final Result      Acute displaced fracture of the LEFT femoral neck with varus angulation.      DX-CHEST-LIMITED (1 VIEW)   Final Result      No acute cardiopulmonary disease.           Assessment/Plan  * Closed fracture of neck of left femur (HCC)- (present on admission)   Assessment & Plan     orthopedic evaluation Dr. Alcala   Taken for surgical repair     Essential hypertension- (present on admission)   Assessment & Plan    Controlled with current medication regimen.  Monitor.      Corneal abrasion, right   Assessment & Plan    -Post surgery, likely due to anesthesia  -Discussed with patient to avoid touching eye or rubbing  -We will hold off on eyedrops for now to avoid further injury     Hypokalemia- (present on admission)   Assessment & Plan    -Repeating as needed     Overweight (BMI 25.0-29.9)- (present on admission)   Assessment & Plan    Body mass index is 29.76 kg/m².       Cirrhosis (HCC)- (present on admission)   Assessment & Plan    Without evidence of end-stage disease.  Unclear etiology.  Outpatient follow-up           VTE prophylaxis: enoxaparin

## 2018-12-16 NOTE — PROGRESS NOTES
2 RN skin check complete with James STRICKLAND.   SCDs placed on patient.   Abrasions to bilateral upper extremities. Abrasion to left toe. Abrasion to back.   Pt placed on waffle cushion.   Pt refusing q2h turns due to pain.   Pt verbalizes understanding of skin break down.

## 2018-12-16 NOTE — PROGRESS NOTES
2 RN skin check complete with James STRICKLAND.   SCDs placed on patient.   Abrasions to bilateral upper extremities. Abrasion to left toe.   New potential pressure ulcers noted on  Wound consult and MIDAS placed  The following interventions in place

## 2018-12-16 NOTE — CARE PLAN
Problem: Communication  Goal: The ability to communicate needs accurately and effectively will improve  Outcome: PROGRESSING AS EXPECTED  Report given bedside. Pt updated on plan of care. Pt verbalizes understanding. Pt encouraged to voice needs and concerns. Communication board in use. Call light within reach. Pt calls appropriately. Hourly rounding in place. Pt has no needs or concerns at this time.     Problem: Safety  Goal: Will remain free from falls  Outcome: PROGRESSING AS EXPECTED  Bed alarm in place. Pt educated on need to call before getting out of bed. Pt verbalizes understanding. Bed locked. DME in bathroom. Room safety check. Bed in lowest position. Pt calls for help appropriately on call light. Call light with in reach. Hourly rounding in place.

## 2018-12-16 NOTE — ED NOTES
Pt. Sent upstairs with her dentures. RN placed the pts. Dentures in a denture cup.    The pts. Family took home the pts. Shoes, pants, and shirt.

## 2018-12-16 NOTE — PROGRESS NOTES
Left displaced femoral neck fracture.    Plan:  - Admit to medicine  - NWB pending surgery  - To OR tomorrow AM for L hip hemiarthroplasty  - NPO after midnight

## 2018-12-16 NOTE — PROGRESS NOTES
Pt had an order for NPO( sips with medications). Pt given an oxycodone with water. Pill got stuck in patient's throat. Pt unable to swallow pill down. Pt made strict NPO per nursing judgement, and swallow evaluation ordered per protocol.

## 2018-12-16 NOTE — ASSESSMENT & PLAN NOTE
-Post surgery, likely due to anesthesia  -Discussed with patient to avoid touching eye or rubbing  -now resolved

## 2018-12-16 NOTE — PROGRESS NOTES
Pt transferred to floor from PACU. Alert and oriented x4. Pt given small sip of water and no signs of aspiration noted. VSS. C/o pain in right eye.+1 edema noted. Pt will not open eye for assessment. Warm compress placed on eye. Dr Gold informed and into see pt at 1300. No orders obtained at this time.

## 2018-12-16 NOTE — ED PROVIDER NOTES
ED Provider Note    CHIEF COMPLAINT  Chief Complaint   Patient presents with   • Fall       HPI  Candy Rothman is a 73 y.o. female who presents following a fall while at home.  She states that she was in her kitchen and lost her balance and fell to the ground.  Has had frequent falls in the past.  Has history of multiple lower back surgeries and history of unsteady gait.  Back surgeon was considering EMG to help evaluate this in the past.  Patient specifically denies passing out.  No chest pain or trouble breathing.  No head trauma.  No neck or back pain more than usual.  States that she has significant left hip and thigh pain.  Was unable to stand secondary to the pain.  She was on the ground in the kitchen for about 6 hours prior to being able to shuffle on the ground to a phone to call for help.  She was brought in by EMS.  Denies chest pain or trouble breathing.  No abdominal pain.  No nausea or vomiting or recent illness or fevers.  Denies any open wounds.  Denies any right leg pain.    REVIEW OF SYSTEMS  See HPI for further details. All other systems are negative.     PAST MEDICAL HISTORY   has a past medical history of Acute respiratory failure (HCC) (September 2011); Agoraphobia; Agoraphobia with panic disorder; Anesthesia; Arthritis; Depression ( ); Fall; Gout; Gouty arthropathy, unspecified; Hyperlipidemia; Hypertension; Hypothyroidism; Lightheadedness; Other chronic pulmonary heart diseases; Overweight, obesity and other hyperalimentation; Pain; Pulmonary hypertension ([facility]); PVD (peripheral vascular disease) ([facility]); and Unspecified hypothyroidism.    SOCIAL HISTORY  Social History     Social History Main Topics   • Smoking status: Current Every Day Smoker     Packs/day: 0.25     Types: Cigarettes   • Smokeless tobacco: Never Used   • Alcohol use Yes      Comment: 2 per day   • Drug use: No   • Sexual activity: Not on file       SURGICAL HISTORY   has a past surgical history that includes lumbar fusion  anterior (2006); lumbar laminectomy diskectomy (1996); ganglion excision (1987); carotid endarterectomy (7/19/2010); and fusion.    CURRENT MEDICATIONS  Home Medications     Reviewed by Bhumika Anthony R.N. (Registered Nurse) on 12/15/18 at 1945  Med List Status: Not Addressed   Medication Last Dose Status   allopurinol (ZYLOPRIM) 300 MG TABS  Active   allopurinol (ZYLOPRIM) 300 MG TABS  Active   alprazolam (XANAX) 0.5 MG TABS  Active   alprazolam (XANAX) 0.5 MG TABS  Active   aspirin 81 MG tablet  Active   aspirin EC (ECOTRIN) 81 MG TBEC  Active   buPROPion (WELLBUTRIN) 75 MG TABS  Active   buPROPion (WELLBUTRIN) 75 MG TABS  Active   Cholecalciferol (VITAMIN D) 2000 UNIT TABS  Active   Coenzyme Q10 (CO Q-10) 200 MG CAPS  Active   Coenzyme Q10 200 MG CAPS  Active   colchicine (COLCRYS) 0.6 MG TABS  Active   Coral Calcium 1000 (390 CA) MG TABS  Active   Coral Calcium 1000 (390 CA) MG TABS  Active   Cyanocobalamin (B-12) 2500 MCG TABS  Active   cyanocobalamin (VITAMIN B-12) 500 MCG TABS  Active   Diphenhydramine-APAP, sleep, (EXCEDRIN PM PO)  Active   Diphenhydramine-APAP, sleep, (TYLENOL PM EXTRA STRENGTH)  MG TABS  Active   furosemide (LASIX) 40 MG TABS  Active   gabapentin (NEURONTIN) 300 MG CAPS  Active   gabapentin (NEURONTIN) 300 MG CAPS  Active   Ibuprofen 200 MG CAPS  Active   lactulose 20 GM/30ML SOLN  Active   levothyroxine (SYNTHROID) 137 MCG TABS  Active   levothyroxine (SYNTHROID) 137 MCG TABS  Active   magnesium hydroxide (MILK OF MAGNESIA) 400 MG/5ML SUSP  Active   Multiple Vitamins-Minerals (OCUVITE) TABS  Active   Multiple Vitamins-Minerals (OCUVITE-LUTEIN) CAPS  Active   mupirocin (BACTROBAN) 2 % OINT  Active   Non Formulary Request  Active   Non Formulary Request  Active   Non Formulary Request  Active   Non Formulary Request  Active   Potassium Gluconate 550 MG TABS  Active   spironolactone (ALDACTONE) 100 MG TABS  Active   sulfamethoxazole-trimethoprim (BACTRIM DS,SEPTRA DS) 800-160 MG TABS  " Active   tiotropium (SPIRIVA) 18 MCG CAPS  Active   tramadol (ULTRAM) 50 MG TABS  Active   valsartan (DIOVAN) 320 MG tablet  Active   VITAMIN D PO  Active                ALLERGIES  Allergies   Allergen Reactions   • Amoxicillin    • Amoxicillin      unsure   • Metoprolol      Pt states she \"\" when she took it.   • Statins [Hmg-Coa-R Inhibitors]      Severe myalgias and elevated CPK.   • Statins [Hmg-Coa-R Inhibitors]      Extreme weakness and falls   • Tricor      Severe myalgias and elevated CPK.       PHYSICAL EXAM  VITAL SIGNS: BP (!) 178/95   Pulse 68   Temp 36.4 °C (97.5 °F) (Temporal)   Resp (!) 21   Ht 1.6 m (5' 3\")   SpO2 99%   BMI 37.06 kg/m²   Pulse ox interpretation: I interpret this pulse ox as normal.  Constitutional: Alert in no apparent distress.  HENT: No signs of trauma, Bilateral external ears normal, Nose normal.   Eyes: Pupils are equal and reactive, Conjunctiva normal, Non-icteric.   Neck: Normal range of motion, No tenderness, Supple, No stridor.   Cardiovascular: Regular rate and rhythm, no murmurs.   Thorax & Lungs: Normal breath sounds, No respiratory distress, No wheezing, No chest tenderness.   Abdomen: Bowel sounds normal, Soft, No tenderness, No masses, No pulsatile masses. No peritoneal signs.  Skin: Warm, Dry, No erythema, No rash.   Back: No bony tenderness, No CVA tenderness.   Extremities: Intact distal pulses, No edema, No tenderness, No cyanosis  Musculoskeletal: Left hip tenderness with inability to range at the hip secondary to pain. No major deformities noted.   Neurologic: Alert , Normal motor function and gait, Normal sensory function, No focal deficits noted.   Psychiatric: Affect normal, Judgment normal, Mood normal.       DIAGNOSTIC STUDIES / PROCEDURES    EKG  12/15/2018 at 8:38 PM  Sinus rhythm at 71  WA, QRS, QTC within normal limits  Normal axis  PACs present  Lateral T wave inversions in V5 and V6  EKG in 2014 showing T wave abnormalities in V5 and " V6 at that time as well.    LABS  Labs Reviewed   CBC WITH DIFFERENTIAL   COMP METABOLIC PANEL   TROPONIN   PROTHROMBIN TIME   APTT       RADIOLOGY  DX-PELVIS-1 OR 2 VIEWS   Final Result      1.  LEFT femoral neck fracture with displacement and varus stimulation.   2.  No pelvic fracture demonstrated.      DX-FEMUR-1 VIEW LEFT   Final Result      Acute displaced fracture of the LEFT femoral neck with varus angulation.      DX-CHEST-LIMITED (1 VIEW)   Final Result      No acute cardiopulmonary disease.          COURSE & MEDICAL DECISION MAKING  Pertinent Labs & Imaging studies reviewed. (See chart for details)  73 y.o. female presenting following a mechanical ground-level fall at home.  Denies syncope.  Denies any head or neck pain or back pain.  Does have chronic back pain issues for multiple surgeries in the past.  She does have a history of unsteady gait at baseline and family notes that her spine surgeon has considered EMG for further evaluation.  This chronic unsteady gait likely contributed to her fall today.  No obvious signs of skin breakdown from her prolonged time on the ground.  Does have pain to the left hip and inability to move the left hip secondary to pain.  No open wounds.  Neurovascularly intact distally.  No chest pain or trouble breathing.  Patient is alert and awake and oriented.    X-rays were performed which showed left hip fracture at the femoral neck with mild displacement.    Spoke with the hospitalist, Dr. Almaguer, who is agreeable with the admission.  Was able to speak with Dr. Alcala who is recommending n.p.o. after midnight and will schedule surgical intervention in the morning.    Patient and family were updated regarding the results of the workup and need for surgical intervention to address the fracture.    The patient was admitted in guarded condition.    FINAL IMPRESSION  Left femoral neck fracture, closed, mildly displaced  Mechanical ground-level fall      Electronically signed by:  Lucius Dickey, 12/15/2018 8:18 PM

## 2018-12-16 NOTE — OP REPORT
DATE OF OPERATION: 12/16/2018     PREOPERATIVE DIAGNOSIS: left displaced femoral neck fracture    POSTOPERATIVE DIAGNOSIS: Same    PROCEDURE PERFORMED: left hip hemiarthroplasty    SURGEON: Juan Pablo Alcala M.D.     ASSISTANT:  Theo Hi DO    ANESTHESIOLOGIST: Napoleon Guerrero MD    ANESTHESIA: General    ASA CLASSIFICATION: III    INDICATIONS: The patient is a 73 y.o. female with a left femoral neck fracture resulting from a ground level fall.  The patient denies antecedent pain, and was found to have a normal neurovascular exam and skin envelope.  Radiographs demonstrated the displaced femoral neck fracture.  She denies antecedent pain.  Given these findings, the patient is an appropriately indicated candidate for surgical treatment of the femoral neck fracture with hemiarthroplasty.  I discussed the risks and benefits of the procedure, including the risks of infection, wound healing complication, neurovascular injury, instability, limb length discrepancy, need for revision surgery, and the medical risks of anesthesia including MI, stroke, and death.  Benefits include early mobilization, improved chance of union, and reduction in the medical risks of hip fractures.  Alternatives to surgery were also discussed, including non-operative management, which I did not recommend.  The patient was in agreement with the plan to proceed, and the informed consent was signed and documented.  I met with the patient pre-operatively and marked the operative extremity with their agreement.  We proceeded to the operating room.     WOUND CLASSIFICATION: Class I    SPECIMEN: None    ESTIMATED BLOOD LOSS: 50 mL    PROCEDURE:  The patient underwent anesthesia, and was positioned in the right lateral decubitus position with all bony prominences well padded and an axillary roll placed.  Sequential compression devices were employed.  The correct operative site was prepped and draped into a sterile field, and the surgical team scrubbed in.   A procedural pause was conducted to verify correct patient, correct extremity, presence of the surgeons initials on the operative extremity, and administration of IV antibiotics, in this case, Ancef.    After generalized agreement, a posterior, or Southern, approach to the hip was performed.  The fascia was split in line with the incision to the tip of the greater troch, then curved posteriorly to parallel the gluteal fibers.  There short external rotators were taken down subperiosteally using cautery, and a posterior capsulotomy was performed, preserving the labrum.    The femoral head was then removed, and sized to a size 45 mm.  We selected a size 45 bipolar shell.  The acetabulum was inspected and cleared of foreign material.  We then placed our femoral neck retractor, and sequentially reamed and broached the femur to a size 14 broach trial.  We trialed with a -3 mm inner head trial and size 45 bipolar shell trial and 14 high offset stem, and were quite satisfied with limb lengths and soft tissue tension.  The implant was stable in all positions, dislocating only with maximal flexion, adduction, and internal rotation, as well as traction.  We selected a size 14 high offset Synergy [employer]hew press fit stem.  This was impacted into place, achieving good fit with no motion.    We then thoroughly cleaned the trunion, and impacted the real -3 mm head and 45 mm shell into place.  The hip was reduced, and the wound was flooded with a dilute betadine solution.  This was irrigated away after several minutes.  We performed our side to side posterior capsule repair with #5 Ticron sutures.  The wound was then closed in layers, with #1 Vicryl in the fascia, 2-0 vicryl in the subcutaneous tissue, and staples in the skin.  Sterile dressings were applied, and the patient was transferred to PACU in stable condition.    The patient tolerated the procedure well. There were no apparent complications. All sponge, needle, and  instrument counts were correct on two separate occasions. She was awakened, extubated, and transferred to the recovery room in satisfactory condition.     The use of Theo Hi DO as a surgical assistant was necessary for assistance with exposure, retraction, fracture reduction, instrumentation, and closure.      Post-Operative Plan:    1.  The patient should bear weight as tolerated on their operative extremity.  Gait aids (crutch or crutches, cane, walker) may be used as needed, and may be discontinued when no longer required.  2.  IV antibiotics - may be continued for 24 hours, but are not required.  3.  DVT prophylaxis - SCD's and Lovenox 40 mg SQ daily while inpatient.  The patient may transition to Aspirin 325 mg PO BID as an outpatient  4.  Discharge planning  ____________________________________   Juan Pablo Alcala M.D.   DD: 12/16/2018  9:04 AM

## 2018-12-16 NOTE — CONSULTS
12/16/2018    Reason for consultation:  Left femoral neck fracture    Inpatient consultation on Candy Rothman at the request for Dr. Dickey.  The patient is a 73 y.o. female who presents with a left displaced femoral neck fracture due to ground level fall.  The patient noted immediate pain and inability to move the affected extremity due to pain.  They were evaluated in the ER, and Orthopedics was consulted. Patient denies numbness, paresthesias, loss of consciousness or other symptoms.  They also deny any antecedent pain in that hip.  They do routinely require a walker and/or wheelchair for mobility.  The patient lives alone.    Past Medical History:   Diagnosis Date   • Acute respiratory failure (HCC) September 2011    Cardiovascular shock and respiratory failure, requiring intubation and medical management. Echo, MPI and CT of chest all normal.   • Agoraphobia    • Agoraphobia with panic disorder    • Anesthesia     6 days to wake up from surgery   • Arthritis    • Depression     • Fall    • Gout    • Gouty arthropathy, unspecified    • Hyperlipidemia    • Hypertension    • Hypothyroidism    • Lightheadedness    • Other chronic pulmonary heart diseases    • Overweight, obesity and other hyperalimentation    • Pain     Back   • Pulmonary hypertension ([facility])    • PVD (peripheral vascular disease) ([facility])    • Unspecified hypothyroidism        Past Surgical History:   Procedure Laterality Date   • CAROTID ENDARTERECTOMY  7/19/2010    Performed by ILIANA MARINO at SURGERY [facility] ORS   • LUMBAR FUSION ANTERIOR  2006   • LUMBAR LAMINECTOMY DISKECTOMY  1996   • GANGLION EXCISION  1987    right foot   • FUSION      4 vertabra       Medications  No current facility-administered medications on file prior to encounter.      Current Outpatient Prescriptions on File Prior to Encounter   Medication Sig Dispense Refill   • tramadol (ULTRAM) 50 MG TABS Take 1 Tab by mouth every 6 hours as needed (back pain). 20 Tab 0   •  tiotropium (SPIRIVA) 18 MCG CAPS Inhale 1 Cap by mouth every day. 30 Cap 0   • furosemide (LASIX) 40 MG TABS Take 1 Tab by mouth every day. 30 Tab 0   • spironolactone (ALDACTONE) 100 MG TABS Take 1 Tab by mouth every day. 30 Tab 0   • lactulose 20 GM/30ML SOLN Take 30 mL by mouth 3 times a day. 30 Each 0   • magnesium hydroxide (MILK OF MAGNESIA) 400 MG/5ML SUSP Take 30 mL by mouth 2 times a day as needed. 1 Bottle 0   • gabapentin (NEURONTIN) 300 MG CAPS Take 300 mg by mouth 3 times a day.     • levothyroxine (SYNTHROID) 137 MCG TABS Take 137 mcg by mouth every day with lunch.     • buPROPion (WELLBUTRIN) 75 MG TABS Take 75 mg by mouth every day with lunch.     • colchicine (COLCRYS) 0.6 MG TABS Take 1.2 mg by mouth every day with lunch.     • allopurinol (ZYLOPRIM) 300 MG TABS Take 300 mg by mouth every day with lunch.     • Cholecalciferol (VITAMIN D) 2000 UNIT TABS Take 6,000 Units by mouth every day with lunch.     • Non Formulary Request Take 2 Caps by mouth every day with lunch. Indications: JUICE PLUS GARDEN BLEND     • Non Formulary Request Take 2 Caps by mouth every day with lunch. Indications: JUICE PLUS PRAVIN     • Non Formulary Request Take 2 Caps by mouth every day with lunch. Indications: jucie plus orchard     • cyanocobalamin (VITAMIN B-12) 500 MCG TABS Take 2,500 mcg by mouth every day with lunch.     • Coenzyme Q10 200 MG CAPS Take 1 Cap by mouth every day with lunch.     • Multiple Vitamins-Minerals (OCUVITE) TABS Take 1 Tab by mouth every day with lunch.     • alprazolam (XANAX) 0.5 MG TABS Take 0.75 mg by mouth every bedtime. Indications: Trouble Sleeping     • aspirin EC (ECOTRIN) 81 MG TBEC Take 162 mg by mouth every bedtime.     • Diphenhydramine-APAP, sleep, (TYLENOL PM EXTRA STRENGTH)  MG TABS Take 1 Tab by mouth every bedtime.     • Coral Calcium 1000 (390 CA) MG TABS Take 2 Tabs by mouth every bedtime.     • mupirocin (BACTROBAN) 2 % OINT Apply to affected area(s) every day. 1  Tube 0   • sulfamethoxazole-trimethoprim (BACTRIM DS,SEPTRA DS) 800-160 MG TABS Take 1 Tab by mouth every 12 hours. 20 Tab 0   • gabapentin (NEURONTIN) 300 MG CAPS Take 300 mg by mouth 3 times a day.     • levothyroxine (SYNTHROID) 137 MCG TABS Take 137 mcg by mouth every day.     • allopurinol (ZYLOPRIM) 300 MG TABS Take 300 mg by mouth every day.     • Potassium Gluconate 550 MG TABS Take 1 Tab by mouth every day.     • Cyanocobalamin (B-12) 2500 MCG TABS Take 1 Tab by mouth every day.     • Coenzyme Q10 (CO Q-10) 200 MG CAPS Take 1 Cap by mouth every day.     • Multiple Vitamins-Minerals (OCUVITE-LUTEIN) CAPS Take 1 Cap by mouth every day.     • Coral Calcium 1000 (390 CA) MG TABS Take 2 Tabs by mouth every day.     • Non Formulary Request 2 each of Juice Plus Garden Blend; Oniel Blend; Orchard Blend     • valsartan (DIOVAN) 320 MG tablet Take 0.5 Tabs by mouth every day. 30 Tab 3   • buPROPion (WELLBUTRIN) 75 MG TABS Take 75 mg by mouth every day.     • VITAMIN D PO Take 6,000 mg by mouth every day.     • Ibuprofen 200 MG CAPS Take 600 mg by mouth 3 times a day. Takes 3 pills     • alprazolam (XANAX) 0.5 MG TABS Take 0.75 mg by mouth every bedtime. And 0.25 mg if needed     • Diphenhydramine-APAP, sleep, (EXCEDRIN PM PO) Take 1 Tab by mouth every bedtime.     • aspirin 81 MG tablet Take 162 mg by mouth every day.         Allergies  Amoxicillin; Amoxicillin; Metoprolol; Statins [hmg-coa-r inhibitors]; Statins [hmg-coa-r inhibitors]; and Tricor    ROS  Per HPI. All other systems were reviewed and found to be negative    Family History   Problem Relation Age of Onset   • Heart Disease Mother    • Heart Disease Father        Social History     Social History   • Marital status:      Spouse name: N/A   • Number of children: N/A   • Years of education: N/A     Social History Main Topics   • Smoking status: Current Every Day Smoker     Packs/day: 0.25     Types: Cigarettes   • Smokeless tobacco: Never Used  "  • Alcohol use Yes      Comment: 2 per day   • Drug use: No   • Sexual activity: Not on file     Other Topics Concern   • Not on file     Social History Narrative    ** Merged History Encounter **            Physical Exam  Vitals  Blood pressure 131/52, pulse (!) 57, temperature 36.1 °C (96.9 °F), temperature source Temporal, resp. rate 16, height 1.6 m (5' 3\"), weight 74.5 kg (164 lb 3.9 oz), SpO2 96 %, not currently breastfeeding.  General: Well Developed, Well Nourished, no acute distress  Psychiatric: Alert and oriented x3, appropriate responses to questions, pleasant mood and affect.  HEENT: Normocephalic, atraumatic  Eyes: Anicteric, PERRLA, EOMI  Neck: Supple, nontender, no masses  Chest: Symmetric expansion of the chest wall, non-tender to palpation, no distress.  Heart: RRR, palpable peripheral pulses  Abdomen: Soft, NT, ND  Skin: Intact, no open wounds  Extremities: Pain with any attempt at movement of left lower extremity  Neuro: Left lower extremity:  Intact motor function in TA/GS/EHL/P, sensation intact S/S/SP/DP/T  Vascular: Palpable DP on left, Capillary refill <2 seconds    Radiographs:  DX-PELVIS-1 OR 2 VIEWS   Final Result      1.  LEFT femoral neck fracture with displacement and varus stimulation.   2.  No pelvic fracture demonstrated.      DX-FEMUR-1 VIEW LEFT   Final Result      Acute displaced fracture of the LEFT femoral neck with varus angulation.      DX-CHEST-LIMITED (1 VIEW)   Final Result      No acute cardiopulmonary disease.          Laboratory Values  Recent Labs      12/15/18   2050   WBC  20.0*   RBC  5.04   HEMOGLOBIN  16.9*   HEMATOCRIT  48.8*   MCV  96.8   MCH  33.5*   MCHC  34.6   RDW  51.8*   PLATELETCT  149*   MPV  10.7     Recent Labs      12/15/18   2050   SODIUM  137   POTASSIUM  3.3*   CHLORIDE  99   CO2  26   GLUCOSE  136*   BUN  15     Recent Labs      12/15/18   2050   APTT  28.5   INR  1.13         Impression:    #1 Left femoral neck fracture    Plan:    I recommended " operative treatment of their femoral neck fracture by hemiarthroplasty. Risks and benefits of surgery were discussed which include, but are not limited to bleeding, infection, neurovascular damage, instability, limb length discrepancy, symptomatic hardware, DVT, PE, MI, Stroke and death.  Benefits of surgery discussed included early mobility and reduction in the medical comorbidity rate of hip fractures.  We also discussed therapeutic alternatives to surgery, including non-operative management, which I did not recommend given the high medical complication rate of non-operative treatment of hip fractures in geriatric patients.    They understand these risks and benefits and wish to proceed.      Please keep NPO pending surgery.  Non-weightbearing affected extremity pending surgery.    Please see operative note for detailed post-operative plan, including post-op weightbearing status.

## 2018-12-17 LAB
ANION GAP SERPL CALC-SCNC: 6 MMOL/L (ref 0–11.9)
BASOPHILS # BLD AUTO: 0.4 % (ref 0–1.8)
BASOPHILS # BLD: 0.06 K/UL (ref 0–0.12)
BUN SERPL-MCNC: 15 MG/DL (ref 8–22)
CALCIUM SERPL-MCNC: 8.2 MG/DL (ref 8.5–10.5)
CHLORIDE SERPL-SCNC: 106 MMOL/L (ref 96–112)
CO2 SERPL-SCNC: 23 MMOL/L (ref 20–33)
CREAT SERPL-MCNC: 1.32 MG/DL (ref 0.5–1.4)
EOSINOPHIL # BLD AUTO: 0.06 K/UL (ref 0–0.51)
EOSINOPHIL NFR BLD: 0.4 % (ref 0–6.9)
ERYTHROCYTE [DISTWIDTH] IN BLOOD BY AUTOMATED COUNT: 57.7 FL (ref 35.9–50)
GLUCOSE SERPL-MCNC: 72 MG/DL (ref 65–99)
HCT VFR BLD AUTO: 38.5 % (ref 37–47)
HGB BLD-MCNC: 12.4 G/DL (ref 12–16)
IMM GRANULOCYTES # BLD AUTO: 0.07 K/UL (ref 0–0.11)
IMM GRANULOCYTES NFR BLD AUTO: 0.5 % (ref 0–0.9)
LYMPHOCYTES # BLD AUTO: 2.54 K/UL (ref 1–4.8)
LYMPHOCYTES NFR BLD: 17.5 % (ref 22–41)
MCH RBC QN AUTO: 33.5 PG (ref 27–33)
MCHC RBC AUTO-ENTMCNC: 32.2 G/DL (ref 33.6–35)
MCV RBC AUTO: 104.1 FL (ref 81.4–97.8)
MONOCYTES # BLD AUTO: 1.19 K/UL (ref 0–0.85)
MONOCYTES NFR BLD AUTO: 8.2 % (ref 0–13.4)
NEUTROPHILS # BLD AUTO: 10.62 K/UL (ref 2–7.15)
NEUTROPHILS NFR BLD: 73 % (ref 44–72)
NRBC # BLD AUTO: 0 K/UL
NRBC BLD-RTO: 0 /100 WBC
PLATELET # BLD AUTO: 124 K/UL (ref 164–446)
PMV BLD AUTO: 10.9 FL (ref 9–12.9)
POTASSIUM SERPL-SCNC: 4.7 MMOL/L (ref 3.6–5.5)
RBC # BLD AUTO: 3.7 M/UL (ref 4.2–5.4)
SODIUM SERPL-SCNC: 135 MMOL/L (ref 135–145)
WBC # BLD AUTO: 14.5 K/UL (ref 4.8–10.8)

## 2018-12-17 PROCEDURE — G8979 MOBILITY GOAL STATUS: HCPCS | Mod: CJ

## 2018-12-17 PROCEDURE — 700102 HCHG RX REV CODE 250 W/ 637 OVERRIDE(OP): Performed by: HOSPITALIST

## 2018-12-17 PROCEDURE — G8978 MOBILITY CURRENT STATUS: HCPCS | Mod: CM

## 2018-12-17 PROCEDURE — 92610 EVALUATE SWALLOWING FUNCTION: CPT

## 2018-12-17 PROCEDURE — 97535 SELF CARE MNGMENT TRAINING: CPT

## 2018-12-17 PROCEDURE — 97162 PT EVAL MOD COMPLEX 30 MIN: CPT

## 2018-12-17 PROCEDURE — G8996 SWALLOW CURRENT STATUS: HCPCS | Mod: CI

## 2018-12-17 PROCEDURE — G8988 SELF CARE GOAL STATUS: HCPCS | Mod: CI

## 2018-12-17 PROCEDURE — A9270 NON-COVERED ITEM OR SERVICE: HCPCS | Performed by: NURSE PRACTITIONER

## 2018-12-17 PROCEDURE — 700111 HCHG RX REV CODE 636 W/ 250 OVERRIDE (IP): Performed by: ORTHOPAEDIC SURGERY

## 2018-12-17 PROCEDURE — 36415 COLL VENOUS BLD VENIPUNCTURE: CPT

## 2018-12-17 PROCEDURE — G8997 SWALLOW GOAL STATUS: HCPCS | Mod: CI

## 2018-12-17 PROCEDURE — 770001 HCHG ROOM/CARE - MED/SURG/GYN PRIV*

## 2018-12-17 PROCEDURE — 80048 BASIC METABOLIC PNL TOTAL CA: CPT

## 2018-12-17 PROCEDURE — 85025 COMPLETE CBC W/AUTO DIFF WBC: CPT

## 2018-12-17 PROCEDURE — A9270 NON-COVERED ITEM OR SERVICE: HCPCS | Performed by: HOSPITALIST

## 2018-12-17 PROCEDURE — 700102 HCHG RX REV CODE 250 W/ 637 OVERRIDE(OP): Performed by: NURSE PRACTITIONER

## 2018-12-17 PROCEDURE — 99233 SBSQ HOSP IP/OBS HIGH 50: CPT | Performed by: HOSPITALIST

## 2018-12-17 PROCEDURE — G8987 SELF CARE CURRENT STATUS: HCPCS | Mod: CK

## 2018-12-17 PROCEDURE — 97165 OT EVAL LOW COMPLEX 30 MIN: CPT

## 2018-12-17 RX ORDER — LACTULOSE 20 G/30ML
30 SOLUTION ORAL 3 TIMES DAILY PRN
Status: DISCONTINUED | OUTPATIENT
Start: 2018-12-17 | End: 2018-12-26 | Stop reason: HOSPADM

## 2018-12-17 RX ORDER — ASCORBIC ACID 500 MG
500 TABLET ORAL DAILY
COMMUNITY

## 2018-12-17 RX ORDER — CLONIDINE HYDROCHLORIDE 0.1 MG/1
0.1 TABLET ORAL EVERY 6 HOURS PRN
Status: DISCONTINUED | OUTPATIENT
Start: 2018-12-17 | End: 2018-12-20

## 2018-12-17 RX ADMIN — STANDARDIZED SENNA CONCENTRATE AND DOCUSATE SODIUM 2 TABLET: 8.6; 5 TABLET, FILM COATED ORAL at 06:51

## 2018-12-17 RX ADMIN — CEFAZOLIN SODIUM 2 G: 2 INJECTION, SOLUTION INTRAVENOUS at 00:25

## 2018-12-17 RX ADMIN — ENOXAPARIN SODIUM 40 MG: 100 INJECTION SUBCUTANEOUS at 21:37

## 2018-12-17 RX ADMIN — GABAPENTIN 300 MG: 300 CAPSULE ORAL at 00:25

## 2018-12-17 RX ADMIN — LEVOTHYROXINE SODIUM 137 MCG: 137 TABLET ORAL at 06:47

## 2018-12-17 RX ADMIN — LACTULOSE 30 ML: 20 SOLUTION ORAL at 21:37

## 2018-12-17 RX ADMIN — GABAPENTIN 300 MG: 300 CAPSULE ORAL at 16:03

## 2018-12-17 RX ADMIN — OXYCODONE HYDROCHLORIDE 5 MG: 5 TABLET ORAL at 06:51

## 2018-12-17 RX ADMIN — GABAPENTIN 300 MG: 300 CAPSULE ORAL at 21:38

## 2018-12-17 RX ADMIN — STANDARDIZED SENNA CONCENTRATE AND DOCUSATE SODIUM 2 TABLET: 8.6; 5 TABLET, FILM COATED ORAL at 17:30

## 2018-12-17 RX ADMIN — OXYCODONE HYDROCHLORIDE 5 MG: 5 TABLET ORAL at 16:12

## 2018-12-17 RX ADMIN — GABAPENTIN 300 MG: 300 CAPSULE ORAL at 07:36

## 2018-12-17 RX ADMIN — TIOTROPIUM BROMIDE 1 CAPSULE: 18 CAPSULE ORAL; RESPIRATORY (INHALATION) at 06:52

## 2018-12-17 ASSESSMENT — ENCOUNTER SYMPTOMS
NAUSEA: 0
SHORTNESS OF BREATH: 0
FEVER: 0
CONSTIPATION: 0
DIARRHEA: 0
EYE PAIN: 1
COUGH: 0
EYE REDNESS: 1
WHEEZING: 0
ABDOMINAL PAIN: 0
VOMITING: 0
HEADACHES: 0

## 2018-12-17 ASSESSMENT — GAIT ASSESSMENTS
ASSISTIVE DEVICE: FRONT WHEEL WALKER
GAIT LEVEL OF ASSIST: MODERATE ASSIST
DISTANCE (FEET): 3

## 2018-12-17 ASSESSMENT — COGNITIVE AND FUNCTIONAL STATUS - GENERAL
MOBILITY SCORE: 7
SUGGESTED CMS G CODE MODIFIER DAILY ACTIVITY: CK
DRESSING REGULAR LOWER BODY CLOTHING: A LOT
HELP NEEDED FOR BATHING: A LOT
WALKING IN HOSPITAL ROOM: TOTAL
DAILY ACTIVITIY SCORE: 16
MOVING FROM LYING ON BACK TO SITTING ON SIDE OF FLAT BED: UNABLE
MOVING TO AND FROM BED TO CHAIR: UNABLE
SUGGESTED CMS G CODE MODIFIER MOBILITY: CM
TOILETING: TOTAL
DRESSING REGULAR UPPER BODY CLOTHING: A LITTLE
TURNING FROM BACK TO SIDE WHILE IN FLAT BAD: UNABLE
STANDING UP FROM CHAIR USING ARMS: A LOT
CLIMB 3 TO 5 STEPS WITH RAILING: TOTAL

## 2018-12-17 ASSESSMENT — PAIN SCALES - GENERAL: PAINLEVEL_OUTOF10: 4

## 2018-12-17 ASSESSMENT — ACTIVITIES OF DAILY LIVING (ADL): TOILETING: INDEPENDENT

## 2018-12-17 NOTE — PROGRESS NOTES
"S:  Seen and examined.  POD #1 s/p L hip sheldon.  Doing well this morning.  No new complaints, pain controlled.    O: Blood pressure 135/60, pulse 70, temperature 36.8 °C (98.3 °F), temperature source Temporal, resp. rate 18, height 1.6 m (5' 3\"), weight 74.5 kg (164 lb 3.9 oz), SpO2 93 %, not currently breastfeeding..    Intake/Output Summary (Last 24 hours) at 12/17/18 1221  Last data filed at 12/17/18 0500   Gross per 24 hour   Intake              664 ml   Output             1400 ml   Net             -736 ml   .    Operative/injured extremity examined.  Compartments soft, distal light touch sensation intact, firing EHL/TA/GS/P.  Toes warm, well-perfused.  Wound dressing c/d/i    Recent Labs      12/15/18   2050  12/17/18   0603   WBC  20.0*  14.5*   RBC  5.04  3.70*   HEMOGLOBIN  16.9*  12.4   HEMATOCRIT  48.8*  38.5   MCV  96.8  104.1*   MCH  33.5*  33.5*   MCHC  34.6  32.2*   RDW  51.8*  57.7*   PLATELETCT  149*  124*   MPV  10.7  10.9       A/P:    POD #1 s/p L hip sheldon    Antibiotics: None required  Activity: WBAT, posterior precautions operative extremity.  PT today.  Diet: General  DVT: Mechanical (SCDs) + Pharmacologic (Lovenox, OK to d/c home on Aspirin if no contraindications)  Dispo: D/C planning    "

## 2018-12-17 NOTE — DISCHARGE PLANNING
Anticipated Discharge Disposition: SNF    Action: LSW met wit Pt and her DIL and niece Tee from out of town. Pt lives alone and states she drives herself. Pt completed choice form for SNF with [facility] as her first choice and [facility] as her second choice. Pt niece tee pulled this LSW aside to express concerns. Tee is glad that the Pt will go to SNF and stated that the Pt is unsafe at home stating her home is over cluttered and she falls/trips due to the mess. Tee stated that the Pt has been noncomplaint with therapies in the past and that they are trying to get her to go to a senior living apartment.     Barriers to Discharge:Acceptance to SNF    Plan: discharge to SNF upon acceptance and Medical clearance.    Care Transition Team Assessment    Information Source  Orientation : Oriented x 4  Information Given By: Patient  Informant's Name: Candy Rothman  Who is responsible for making decisions for patient? : Patient    Readmission Evaluation  Is this a readmission?: No    Elopement Risk  Legal Hold: No  Ambulatory or Self Mobile in Wheelchair: No-Not an Elopement Risk  Elopement Risk: Not at Risk for Elopement    Interdisciplinary Discharge Planning  Patient or legal guardian wants to designate a caregiver (see row info): No    Discharge Preparedness  What is your plan after discharge?: Skilled nursing facility  What are your discharge supports?: Child  Prior Functional Level: Ambulatory, Drives Self, Independent with Activities of Daily Living, Independent with Medication Management, Uses Cane, Uses Walker, Uses Wheelchair  Difficulity with ADLs: None    Functional Assesment  Prior Functional Level: Ambulatory, Drives Self, Independent with Activities of Daily Living, Independent with Medication Management, Uses Cane, Uses Walker, Uses Wheelchair    Finances  Financial Barriers to Discharge: No  Prescription Coverage: Yes    Vision / Hearing Impairment  Vision Impairment : No  Hearing Impairment :  No    Values / Beliefs / Concerns  Values / Beliefs Concerns : No         Domestic Abuse  Have you ever been the victim of abuse or violence?: No  Physical Abuse or Sexual Abuse: No  Verbal Abuse or Emotional Abuse: No  Possible Abuse Reported to:: Not Applicable    Psychological Assessment  History of Substance Abuse: None  History of Psychiatric Problems: No  Non-compliant with Treatment: No  Newly Diagnosed Illness: No    Discharge Risks or Barriers  Discharge risks or barriers?: Other (comment) (Niece states she believes Pt is unsafe at home due to falls)    Anticipated Discharge Information  Anticipated discharge disposition: SNF  Discharge Address: pending acceptance

## 2018-12-17 NOTE — PROGRESS NOTES
[facility] Medicine Daily Progress Note    Date of Service  12/17/2018    Chief Complaint  73 y.o. female admitted 12/15/2018 with left femoral neck fracture    Interval Problem Update  12/16: OR repair of femoral neck fracture.  Patient developed corneal abrasion.  Trying warm compresses and avoiding eyedrops for now.  Repleting potassium  12/17: Potassium tra to 4.7.  Pending PT OT.  Referred to SNF.  Eye pain resolved.  Passed SLP other than changing to dysphagia 3.    Disposition  PT OT possibly may need placement    Review of Systems  Review of Systems   Constitutional: Negative for fever.   Eyes: Positive for pain and redness.   Respiratory: Negative for cough, shortness of breath and wheezing.    Cardiovascular: Negative for chest pain.   Gastrointestinal: Negative for abdominal pain, constipation, diarrhea, nausea and vomiting.   Genitourinary: Negative for dysuria.   Musculoskeletal: Positive for joint pain.   Neurological: Negative for headaches.        Physical Exam  Temp:  [35.9 °C (96.6 °F)-36.9 °C (98.4 °F)] 36.2 °C (97.1 °F)  Pulse:  [57-86] 60  Resp:  [12-18] 16  BP: (109-166)/(45-84) 158/75    Physical Exam   Constitutional: She appears well-developed.   HENT:   Head: Normocephalic.   Eyes:   Right eye tenderness with conjunctival injection, no foreign body seen grossly   Cardiovascular: Normal rate.  Exam reveals no gallop.    Pulmonary/Chest: No respiratory distress. She has no wheezes.   Abdominal: She exhibits no distension. There is no tenderness.   Musculoskeletal: She exhibits tenderness.   Neurological: She is alert. She exhibits abnormal muscle tone (weak).   Skin:   Hip bandaged       Fluids    Intake/Output Summary (Last 24 hours) at 12/17/18 1009  Last data filed at 12/17/18 0500   Gross per 24 hour   Intake             1814 ml   Output             1750 ml   Net               64 ml       Laboratory  Recent Labs      12/15/18   2050  12/17/18   0603   WBC  20.0*  14.5*   RBC  5.04  3.70*    HEMOGLOBIN  16.9*  12.4   HEMATOCRIT  48.8*  38.5   MCV  96.8  104.1*   MCH  33.5*  33.5*   MCHC  34.6  32.2*   RDW  51.8*  57.7*   PLATELETCT  149*  124*   MPV  10.7  10.9     Recent Labs      12/15/18   2050  12/17/18   0431   SODIUM  137  135   POTASSIUM  3.3*  4.7   CHLORIDE  99  106   CO2  26  23   GLUCOSE  136*  72   BUN  15  15   CREATININE  1.11  1.32   CALCIUM  9.6  8.2*     Recent Labs      12/15/18   2050   APTT  28.5   INR  1.13               Imaging  DX-PELVIS-1 OR 2 VIEWS   Final Result      Satisfactory positioning, in AP view, of new left hip hemiarthroplasty.      DX-PELVIS-1 OR 2 VIEWS   Final Result      1.  LEFT femoral neck fracture with displacement and varus stimulation.   2.  No pelvic fracture demonstrated.      DX-FEMUR-1 VIEW LEFT   Final Result      Acute displaced fracture of the LEFT femoral neck with varus angulation.      DX-CHEST-LIMITED (1 VIEW)   Final Result      No acute cardiopulmonary disease.           Assessment/Plan  * Closed fracture of neck of left femur (HCC)- (present on admission)   Assessment & Plan     orthopedic evaluation Dr. Alcala   Taken for surgical repair     Essential hypertension- (present on admission)   Assessment & Plan    Controlled with current medication regimen.  Monitor.      Corneal abrasion, right   Assessment & Plan    -Post surgery, likely due to anesthesia  -Discussed with patient to avoid touching eye or rubbing  -now resolved     Hypokalemia- (present on admission)   Assessment & Plan    -Repeating as needed     Overweight (BMI 25.0-29.9)- (present on admission)   Assessment & Plan    Body mass index is 29.76 kg/m².       Cirrhosis (HCC)- (present on admission)   Assessment & Plan    Without evidence of end-stage disease.  Unclear etiology.  Outpatient follow-up           VTE prophylaxis: enoxaparin

## 2018-12-17 NOTE — PROGRESS NOTES
Shift report received from night RN, assumed care at 0715. Patient up in bed, routinely medicated prn per MAR. AOx4, up with assist, calls appropriately, bed alarm in place. PIV assessed and locked. Dressing intact, O2 2L nasal cannula, SPO2 92%. VTE lovenox and SCDs. Plan is med management until med clears. Needs met at this time.    Discussed POC for multimodal pain management, monitoring VS/labs/I&O, mobility, day time routine, comfort, and safety. Patient has call light and personal belongings within reach. Safety and fall precautions in place. Reviewed current labs, notes, medications, and orders. Hourly rounding in place with RN and CNA.

## 2018-12-17 NOTE — THERAPY
"Occupational Therapy Evaluation completed.   Functional Status: Total A of 2 supine > EOB, total A sock donning without AE, max A sit to stands and side-stepping with FWW, set-up seated grooming   Plan of Care: Will benefit from Occupational Therapy 4 times per week  Discharge Recommendations:  Equipment: Will Continue to Assess for Equipment Needs. Post-acute therapy: Recommend inpatient transitional care services for continued occupational therapy services.     See \"Rehab Therapy-Acute\" Patient Summary Report for complete documentation.    73 y.o. female s/p GLF with resultant L femoral neck fx. Pt underwent posterior hemiarthroplasty. WBAT with posterior hip precautions. Seen for OT eval. Educated pt and family on posterior hip precautions; provided hand-out. Further training indicated. Pt completed supine > EOB (total A), LB dressing, sit to stands at FWW. Pt required cues to shift weight anteriorly, cues for hand placement and sequencing of sit > stands. Able to take a few small side steps, but c/o dizziness, extreme fatigue. Pt on 1L O2 with SpO2 of 92. BP was 97/52 seated EOB. Pt not safe to transfer to bedside chair this session. Pt completed seated oral and hair care. Reported feeling better after sitting EOB for ~5 min. Encouraged to remain EOB to facilitate sitting tolerance (with supv). Pt is limited by: pain, fear, balance impairment, activity intolerance, posterior hip precautions, negatively impacting basic ADL and functional mobility. Pt lives alone. Several family members present who privately expressed some concern regarding pt's ability to care for herself (prior to fall). They hope to secure a senior apt or possibly [facility] while pt is rehabbing. Acute OT to follow with recommendation for post-acute transitional setting (likely SNF as pt would not tolerate 3 hours of therapy at this time).     "

## 2018-12-17 NOTE — THERAPY
"Speech Language Therapy Clinical Swallow Evaluation completed.    Functional Status: Patient AAOx3 with confusion regarding day. Patient endorsed globus sensation x1 with large potasium pill. Otherwise, denied history of dysphagia. She eliminates harder solids at baseline due to edentulous state. Positionally, she was unable to tolerate sitting up at 90* angle to due pain in hip. Therefore, was in a slightly reclined position for evaluation. Oral motor examination revealed no gross asymmetry or weakness. PO trials were administered and consisted of thin liquids via straw, puree, soft solids in mixed consistency form, and dry solids. Swallow trigger was timely and laryngeal elevation complete to palpation. With trials of thin liquids via serial sips from the straw, patient had immediate throat clear x2. However, with cue to take single sips from the cup patient did not show signs of aspiration. Mastication timely and functional with solids tested. Education was provided regarding current status, diet modification, and SLP recs.     Recommendations - At this time, patient appears at the level for a slight downgrade to D3/thin liquids given intermittent supervision. Patient to sit up as much as possible during PO intake. Medications to be administered whole in puree with continued difficulty, particularly with larger pills.                             Strategies: Monitor during meals and Head of Bed at 90 Degrees                            Medication Administration: Float whole in puree     Plan of Care: Will benefit from Speech Therapy 3 times per week    Post-Acute Therapy: Currently anticipate no further skilled therapy needs once patient is discharged from the inpatient setting.    See \"Rehab Therapy-Acute\" Patient Summary Report for complete documentation. Thank you for the consult.         "

## 2018-12-17 NOTE — DISCHARGE PLANNING
Received Choice form at 1029  Agency/Facility Name: [facility]  Referral sent per Choice form @ 8111

## 2018-12-17 NOTE — PROGRESS NOTES
Spoke to Lab, A1C has been collected but not analyzed yet due to machine being down. Maintanence to be done on this today.

## 2018-12-17 NOTE — PROGRESS NOTES
Received bedside shift report from Jaylin STRICKLAND at 1900.    Pt resting in bed at this time, a&o x4, states pain 8/10--medicated per MAR. Dressing in place to surgical incision to left hip CDI, polar ice in use. Pedal pulse 1+, cap refill <3 sec, pt able to wiggle toes, denies numbness/tingling.     Pt refusing ambulation due to increased pain and swelling to right eye, refusing to open eyes at this time. Per MD progress note pt has a new corneal abrasion.     Call light and belongings within reach, bed down and locked, hourly rounding in progress.

## 2018-12-17 NOTE — RESPIRATORY CARE
COPD EDUCATION by COPD CLINICAL EDUCATOR  12/17/2018 at 8:04 AM by Minnie Howard     Patient reviewed by COPD education team. Patient does not qualify for COPD program.

## 2018-12-17 NOTE — CARE PLAN
Problem: Safety  Goal: Will remain free from falls  Call light and belongings within reach, bed down and locked, hourly rounding in progress. Bed alarm in use, pt calls appropriately.       Problem: Venous Thromboembolism (VTW)/Deep Vein Thrombosis (DVT) Prevention:  Goal: Patient will participate in Venous Thrombosis (VTE)/Deep Vein Thrombosis (DVT)Prevention Measures  SCD's in use, lovenox ordered.

## 2018-12-18 PROBLEM — R33.9 URINARY RETENTION: Status: ACTIVE | Noted: 2018-12-18

## 2018-12-18 PROBLEM — D72.829 LEUKOCYTOSIS: Status: ACTIVE | Noted: 2018-12-18

## 2018-12-18 LAB
ANION GAP SERPL CALC-SCNC: 5 MMOL/L (ref 0–11.9)
BASOPHILS # BLD AUTO: 0.2 % (ref 0–1.8)
BASOPHILS # BLD: 0.03 K/UL (ref 0–0.12)
BUN SERPL-MCNC: 15 MG/DL (ref 8–22)
CALCIUM SERPL-MCNC: 8.7 MG/DL (ref 8.5–10.5)
CHLORIDE SERPL-SCNC: 104 MMOL/L (ref 96–112)
CO2 SERPL-SCNC: 26 MMOL/L (ref 20–33)
CREAT SERPL-MCNC: 1.26 MG/DL (ref 0.5–1.4)
EOSINOPHIL # BLD AUTO: 0.1 K/UL (ref 0–0.51)
EOSINOPHIL NFR BLD: 0.8 % (ref 0–6.9)
ERYTHROCYTE [DISTWIDTH] IN BLOOD BY AUTOMATED COUNT: 56.8 FL (ref 35.9–50)
EST. AVERAGE GLUCOSE BLD GHB EST-MCNC: 123 MG/DL
GLUCOSE SERPL-MCNC: 133 MG/DL (ref 65–99)
HBA1C MFR BLD: 5.9 % (ref 0–5.6)
HCT VFR BLD AUTO: 36.9 % (ref 37–47)
HGB BLD-MCNC: 12.2 G/DL (ref 12–16)
IMM GRANULOCYTES # BLD AUTO: 0.06 K/UL (ref 0–0.11)
IMM GRANULOCYTES NFR BLD AUTO: 0.5 % (ref 0–0.9)
LYMPHOCYTES # BLD AUTO: 1.17 K/UL (ref 1–4.8)
LYMPHOCYTES NFR BLD: 9.1 % (ref 22–41)
MCH RBC QN AUTO: 34.2 PG (ref 27–33)
MCHC RBC AUTO-ENTMCNC: 33.1 G/DL (ref 33.6–35)
MCV RBC AUTO: 103.4 FL (ref 81.4–97.8)
MONOCYTES # BLD AUTO: 1.14 K/UL (ref 0–0.85)
MONOCYTES NFR BLD AUTO: 8.8 % (ref 0–13.4)
NEUTROPHILS # BLD AUTO: 10.39 K/UL (ref 2–7.15)
NEUTROPHILS NFR BLD: 80.6 % (ref 44–72)
NRBC # BLD AUTO: 0 K/UL
NRBC BLD-RTO: 0 /100 WBC
PLATELET # BLD AUTO: 112 K/UL (ref 164–446)
PMV BLD AUTO: 11.2 FL (ref 9–12.9)
POTASSIUM SERPL-SCNC: 3.6 MMOL/L (ref 3.6–5.5)
RBC # BLD AUTO: 3.57 M/UL (ref 4.2–5.4)
SODIUM SERPL-SCNC: 135 MMOL/L (ref 135–145)
WBC # BLD AUTO: 12.9 K/UL (ref 4.8–10.8)

## 2018-12-18 PROCEDURE — 700102 HCHG RX REV CODE 250 W/ 637 OVERRIDE(OP): Performed by: HOSPITALIST

## 2018-12-18 PROCEDURE — 92526 ORAL FUNCTION THERAPY: CPT

## 2018-12-18 PROCEDURE — 770006 HCHG ROOM/CARE - MED/SURG/GYN SEMI*

## 2018-12-18 PROCEDURE — A9270 NON-COVERED ITEM OR SERVICE: HCPCS | Performed by: HOSPITALIST

## 2018-12-18 PROCEDURE — 97530 THERAPEUTIC ACTIVITIES: CPT

## 2018-12-18 PROCEDURE — 99232 SBSQ HOSP IP/OBS MODERATE 35: CPT | Performed by: INTERNAL MEDICINE

## 2018-12-18 PROCEDURE — 36415 COLL VENOUS BLD VENIPUNCTURE: CPT

## 2018-12-18 PROCEDURE — 700102 HCHG RX REV CODE 250 W/ 637 OVERRIDE(OP): Performed by: PHYSICIAN ASSISTANT

## 2018-12-18 PROCEDURE — A9270 NON-COVERED ITEM OR SERVICE: HCPCS | Performed by: INTERNAL MEDICINE

## 2018-12-18 PROCEDURE — 700102 HCHG RX REV CODE 250 W/ 637 OVERRIDE(OP): Performed by: INTERNAL MEDICINE

## 2018-12-18 PROCEDURE — 51798 US URINE CAPACITY MEASURE: CPT

## 2018-12-18 PROCEDURE — 700102 HCHG RX REV CODE 250 W/ 637 OVERRIDE(OP): Performed by: NURSE PRACTITIONER

## 2018-12-18 PROCEDURE — 700111 HCHG RX REV CODE 636 W/ 250 OVERRIDE (IP): Performed by: ORTHOPAEDIC SURGERY

## 2018-12-18 PROCEDURE — A9270 NON-COVERED ITEM OR SERVICE: HCPCS | Performed by: NURSE PRACTITIONER

## 2018-12-18 PROCEDURE — 80048 BASIC METABOLIC PNL TOTAL CA: CPT

## 2018-12-18 PROCEDURE — 85025 COMPLETE CBC W/AUTO DIFF WBC: CPT

## 2018-12-18 PROCEDURE — A9270 NON-COVERED ITEM OR SERVICE: HCPCS | Performed by: PHYSICIAN ASSISTANT

## 2018-12-18 RX ORDER — TRAMADOL HYDROCHLORIDE 50 MG/1
50-100 TABLET ORAL EVERY 6 HOURS PRN
Status: DISCONTINUED | OUTPATIENT
Start: 2018-12-18 | End: 2018-12-26 | Stop reason: HOSPADM

## 2018-12-18 RX ORDER — MORPHINE SULFATE 10 MG/ML
2 INJECTION, SOLUTION INTRAMUSCULAR; INTRAVENOUS
Status: DISCONTINUED | OUTPATIENT
Start: 2018-12-18 | End: 2018-12-20

## 2018-12-18 RX ORDER — TRAMADOL HYDROCHLORIDE 50 MG/1
50-100 TABLET ORAL EVERY 6 HOURS PRN
Status: DISCONTINUED | OUTPATIENT
Start: 2018-12-18 | End: 2018-12-18

## 2018-12-18 RX ORDER — HYDRALAZINE HYDROCHLORIDE 20 MG/ML
10 INJECTION INTRAMUSCULAR; INTRAVENOUS EVERY 6 HOURS PRN
Status: DISCONTINUED | OUTPATIENT
Start: 2018-12-18 | End: 2018-12-21

## 2018-12-18 RX ORDER — OXYCODONE HYDROCHLORIDE 5 MG/1
2.5-1 TABLET ORAL
Status: DISCONTINUED | OUTPATIENT
Start: 2018-12-18 | End: 2018-12-20

## 2018-12-18 RX ORDER — ZOLPIDEM TARTRATE 5 MG/1
2.5 TABLET ORAL ONCE
Status: COMPLETED | OUTPATIENT
Start: 2018-12-18 | End: 2018-12-18

## 2018-12-18 RX ORDER — TAMSULOSIN HYDROCHLORIDE 0.4 MG/1
0.4 CAPSULE ORAL
Status: DISCONTINUED | OUTPATIENT
Start: 2018-12-18 | End: 2018-12-26 | Stop reason: HOSPADM

## 2018-12-18 RX ADMIN — GABAPENTIN 300 MG: 300 CAPSULE ORAL at 15:13

## 2018-12-18 RX ADMIN — LACTULOSE 30 ML: 20 SOLUTION ORAL at 05:08

## 2018-12-18 RX ADMIN — LEVOTHYROXINE SODIUM 137 MCG: 137 TABLET ORAL at 04:59

## 2018-12-18 RX ADMIN — ENOXAPARIN SODIUM 40 MG: 100 INJECTION SUBCUTANEOUS at 22:24

## 2018-12-18 RX ADMIN — TRAMADOL HYDROCHLORIDE 100 MG: 50 TABLET, FILM COATED ORAL at 17:00

## 2018-12-18 RX ADMIN — TIOTROPIUM BROMIDE 1 CAPSULE: 18 CAPSULE ORAL; RESPIRATORY (INHALATION) at 04:59

## 2018-12-18 RX ADMIN — SPIRONOLACTONE 100 MG: 25 TABLET, FILM COATED ORAL at 05:00

## 2018-12-18 RX ADMIN — BUPROPION HYDROCHLORIDE 75 MG: 75 TABLET, FILM COATED ORAL at 04:59

## 2018-12-18 RX ADMIN — ALLOPURINOL 300 MG: 300 TABLET ORAL at 04:59

## 2018-12-18 RX ADMIN — CLONIDINE HYDROCHLORIDE 0.1 MG: 0.1 TABLET ORAL at 03:32

## 2018-12-18 RX ADMIN — GABAPENTIN 300 MG: 300 CAPSULE ORAL at 05:02

## 2018-12-18 RX ADMIN — TRAMADOL HYDROCHLORIDE 50 MG: 50 TABLET, FILM COATED ORAL at 10:27

## 2018-12-18 RX ADMIN — GABAPENTIN 300 MG: 300 CAPSULE ORAL at 22:24

## 2018-12-18 RX ADMIN — TAMSULOSIN HYDROCHLORIDE 0.4 MG: 0.4 CAPSULE ORAL at 15:13

## 2018-12-18 RX ADMIN — STANDARDIZED SENNA CONCENTRATE AND DOCUSATE SODIUM 2 TABLET: 8.6; 5 TABLET, FILM COATED ORAL at 16:58

## 2018-12-18 RX ADMIN — VALSARTAN 160 MG: 80 TABLET, FILM COATED ORAL at 05:00

## 2018-12-18 RX ADMIN — ZOLPIDEM TARTRATE 2.5 MG: 5 TABLET ORAL at 22:24

## 2018-12-18 ASSESSMENT — ENCOUNTER SYMPTOMS
CHILLS: 0
CONSTIPATION: 1
HEADACHES: 0
ABDOMINAL PAIN: 0
VOMITING: 0
COUGH: 0
FEVER: 0
SHORTNESS OF BREATH: 0
NAUSEA: 0

## 2018-12-18 ASSESSMENT — GAIT ASSESSMENTS: GAIT LEVEL OF ASSIST: UNABLE TO PARTICIPATE

## 2018-12-18 ASSESSMENT — PAIN SCALES - GENERAL
PAINLEVEL_OUTOF10: 3
PAINLEVEL_OUTOF10: 2
PAINLEVEL_OUTOF10: 1
PAINLEVEL_OUTOF10: 6
PAINLEVEL_OUTOF10: 4

## 2018-12-18 NOTE — DISCHARGE PLANNING
Faxed therapy notes to [facility] per request.     Agency/Facility Name: [facility]  Spoke To: Sathish  Outcome: Declined, no beds until after [place].

## 2018-12-18 NOTE — ASSESSMENT & PLAN NOTE
Leukocytosis noted on admission  CXR had mild cardiomegaly. Blood cultures are negative. Urine culture was negative.  She has RLQ tenderness. CT showed inflammation of terminal ileum, appendix not seen. US did not visualized appendix.   Surgery consulted, diet slowly advanced and tolerating  Course of ceftriaxone and flagyl for ileitis

## 2018-12-18 NOTE — THERAPY
"Physical Therapy Evaluation completed.   Bed Mobility:  Supine to Sit: Total Assist X 2  Transfers: Sit to Stand: Maximal Assist  Gait: Level Of Assist: Moderate Assist with Front-Wheel Walker   3ft lateral side step     Plan of Care: Will benefit from Physical Therapy 5 times per week  Discharge Recommendations: Equipment: Will Continue to Assess for Equipment Needs. Post-acute therapy Recommend inpatient transitional care services for continued physical therapy services.      Ms. Rothman is a 72 y/o female who presents to acute secondary to ground level fall that resulted in L displaced femoral neck fracture s/p L hemiarthroplasty. She is WBAT. She presents with significant lower extremity weakness, gross motor coordination deficits, dynamic balance impairments, and pain. These deficits negatively impact her ability to perform gait, transfers, bed mobility, and stairs at her prior level of function and prevent her safe discharge home. Difficulty weight bearing initially, however with facilitated weight shift R/L was able to increase load and take a few side steps. Family very supportive and realistic, niece reports plan is to transition patient to ILF/longterm once her rehab is completed. Do recommend post acute rehabiliation to improve her mobility. She would benefit from continued acute physical therapy services to improve her mobility and decrease risk for falls.     See \"Rehab Therapy-Acute\" Patient Summary Report for complete documentation.     "

## 2018-12-18 NOTE — CARE PLAN
Problem: Communication  Goal: The ability to communicate needs accurately and effectively will improve    Intervention: [place] patient and significant other/support system to call light to alert staff of needs  Call light within reach, educated to call staff for any needs.       Problem: Venous Thromboembolism (VTW)/Deep Vein Thrombosis (DVT) Prevention:  Goal: Patient will participate in Venous Thrombosis (VTE)/Deep Vein Thrombosis (DVT)Prevention Measures    Intervention: Ensure patient wears graduated elastic stockings (DEYANIRA hose) and/or SCDs, if ordered, when in bed or chair (Remove at least once per shift for skin check)  Patient wearing SCDs bilaterally for mechanical DVT prevention.

## 2018-12-18 NOTE — PROGRESS NOTES
"   Orthopaedic Progress Note    Interval changes:  Pain control issues, ultram 50-100mg po Q6 routine resumed and jefferson change to 10mg max q3 from 5mg  Cleared for DC to SNF pending medicine clearance    ROS - Patient denies any new issues.  Pain well controlled.    Blood pressure 117/63, pulse (!) 58, temperature 36.1 °C (96.9 °F), temperature source Temporal, resp. rate 16, height 1.6 m (5' 3\"), weight 74.5 kg (164 lb 3.9 oz), SpO2 97 %, not currently breastfeeding.      Patient seen and examined  No acute distress  Breathing non labored  RRR  Left hip surgical dressing is clean, dry, and intact. Patient clearly fires tibialis anterior, EHL, and gastrocnemius/soleus. Sensation is intact to light touch throughout superficial peroneal, deep peroneal, tibial, saphenous, and sural nerve distributions. Strong and palpable 2+ dorsalis pedis and posterior tibial pulses with capillary refill less than 2 seconds. No lower leg tenderness or discomfort.       Recent Labs      12/15/18   2050  12/17/18   0603  12/18/18   1115   WBC  20.0*  14.5*  12.9*   RBC  5.04  3.70*  3.57*   HEMOGLOBIN  16.9*  12.4  12.2   HEMATOCRIT  48.8*  38.5  36.9*   MCV  96.8  104.1*  103.4*   MCH  33.5*  33.5*  34.2*   MCHC  34.6  32.2*  33.1*   RDW  51.8*  57.7*  56.8*   PLATELETCT  149*  124*  112*   MPV  10.7  10.9  11.2       Active Hospital Problems    Diagnosis   • Essential hypertension [I10]     Priority: High     ICD-10 transition     • Hypokalemia [E87.6]   • Corneal abrasion, right [S05.01XA]   • Overweight (BMI 25.0-29.9) [E66.3]   • Closed fracture of neck of left femur (HCC) [S72.002A]   • Cirrhosis (HCC) [K74.60]       Assessment/Plan:  Pain control issues- home ultram resumed and jefferson max increased to 10mg po Q3  POD#2 S/P left hip hemiarthroplasty  Wt bearing status - WBAT  Wound care/Drains - dressing left in place  Future Procedures - none planned   Sutures/Staples out- 10-14 days post operatively  PT/OT-initiated  DVT " Prophylaxis- TEDS/SCDs/Foot pumps/lovenox  Mendiola-none  Case Coordination for Discharge Planning - Disposition SNF

## 2018-12-18 NOTE — PROGRESS NOTES
Mendiola removed per orders on night shift (0320) Pt DTV at 0920. Pt had incontinent bowel movement and unsure if voided also. Bladder scan ordered. Pt denies any discomfort or has the urge to need to void at this time. Pt requested to wait a little longer to see if she could void.

## 2018-12-18 NOTE — ASSESSMENT & PLAN NOTE
May be contributing to increase creatinine, improved  Started on flomax  Mendiola cath removed 12/21 and monitor with bladder scans

## 2018-12-18 NOTE — THERAPY
"Speech Language Therapy dysphagia treatment completed.     Functional Status: Patient was seen on this date for dysphagia treatment with a D3/thin liquid meal tray. Similar to last session, she was unable to tolerate sitting up at 90* for session due to pain in hip but positioning appeared adequate for safe PO. Patient consumed small amount of items from meal tray (~20%) with no overt s/sx of aspiration. Patient independently took small bites and single sips from the straw. She required assistance to cut solids into smaller pieces and requested that diet be changed to D3/chopped. She endorsed poor PO intake at baseline - consider supplement order.     Recommendations: At this time, patient appears to be tolerating current diet and would recommend continue D3/thin liquids. SLP to follow 1-2x likely.     Plan of Care: Will benefit from Speech Therapy 1-2x likely     Post-Acute Therapy: Currently anticipate no further skilled therapy needs once patient is discharged from the inpatient setting.    See \"Rehab Therapy-Acute\" Patient Summary Report for complete documentation.     "

## 2018-12-18 NOTE — PROGRESS NOTES
Hospital Medicine Daily Progress Note    Date of Service  12/18/2018    Chief Complaint  73 y.o. female admitted 12/15/2018 with fall.    Hospital Course    PMH HTN, HLD, cirrhosis who presented with mechanical fall and found with left femoral neck fracture. Dr. Alcala with ortho was consulted and patient had left hip hemiarthroplasty 12/16.        Interval Problem Update  Leukocytosis noted, decreased. No localizing signs of infection.  Creat improved. Found with urinary retention, ayon cath placed. Started on flomax  Pain is controlled    Consultants/Specialty  Ortho    Code Status  Full    Disposition  SNF  Sutures/Staples out- 10-14 days post operatively  WBAT    Review of Systems  Review of Systems   Constitutional: Negative for chills and fever.   HENT: Negative for congestion.    Respiratory: Negative for cough and shortness of breath.    Cardiovascular: Negative for chest pain.   Gastrointestinal: Positive for constipation. Negative for abdominal pain, nausea and vomiting.   Genitourinary: Negative for dysuria and urgency.   Musculoskeletal: Positive for joint pain.   Skin: Negative for itching.   Neurological: Negative for headaches.        Physical Exam  Temp:  [36 °C (96.8 °F)-36.7 °C (98 °F)] 36.1 °C (96.9 °F)  Pulse:  [58-74] 58  Resp:  [16-18] 16  BP: (117-177)/(61-74) 117/63    Physical Exam   Constitutional: She appears well-developed and well-nourished.   HENT:   Head: Normocephalic.   Mouth/Throat: Oropharynx is clear and moist.   Eyes: Conjunctivae are normal. Right eye exhibits no discharge. Left eye exhibits no discharge.   Cardiovascular: Normal rate and regular rhythm.    Pulmonary/Chest: Effort normal. She has no wheezes. She has no rales.   Abdominal: Soft. There is no tenderness. There is no rebound and no guarding.   Neurological: She is alert.   Oriented to self and place   Skin: Skin is warm and dry.   Nursing note and vitals reviewed.      Fluids    Intake/Output Summary (Last 24  hours) at 12/18/18 1425  Last data filed at 12/18/18 1300   Gross per 24 hour   Intake                0 ml   Output             2700 ml   Net            -2700 ml       Laboratory  Recent Labs      12/15/18   2050  12/17/18   0603  12/18/18   1115   WBC  20.0*  14.5*  12.9*   RBC  5.04  3.70*  3.57*   HEMOGLOBIN  16.9*  12.4  12.2   HEMATOCRIT  48.8*  38.5  36.9*   MCV  96.8  104.1*  103.4*   MCH  33.5*  33.5*  34.2*   MCHC  34.6  32.2*  33.1*   RDW  51.8*  57.7*  56.8*   PLATELETCT  149*  124*  112*   MPV  10.7  10.9  11.2     Recent Labs      12/15/18   2050  12/17/18   0431  12/18/18   1115   SODIUM  137  135  135   POTASSIUM  3.3*  4.7  3.6   CHLORIDE  99  106  104   CO2  26  23  26   GLUCOSE  136*  72  133*   BUN  15  15  15   CREATININE  1.11  1.32  1.26   CALCIUM  9.6  8.2*  8.7     Recent Labs      12/15/18   2050   APTT  28.5   INR  1.13               Imaging  DX-PELVIS-1 OR 2 VIEWS   Final Result      Satisfactory positioning, in AP view, of new left hip hemiarthroplasty.      DX-PELVIS-1 OR 2 VIEWS   Final Result      1.  LEFT femoral neck fracture with displacement and varus stimulation.   2.  No pelvic fracture demonstrated.      DX-FEMUR-1 VIEW LEFT   Final Result      Acute displaced fracture of the LEFT femoral neck with varus angulation.      DX-CHEST-LIMITED (1 VIEW)   Final Result      No acute cardiopulmonary disease.           Assessment/Plan  * Closed fracture of neck of left femur (HCC)- (present on admission)   Assessment & Plan    S/p repair with Dr. Alcala  PTOT  Pain control     Essential hypertension- (present on admission)   Assessment & Plan    Controlled with current medication regimen.  Monitor.      Leukocytosis   Assessment & Plan    Noted on admission  No signs of infection. CXR was normal. UA showed some WBCs but denies dysuria.  Continue to monitor     Urinary retention   Assessment & Plan    May be contributing to increase creatinine  Mendiola cath placed  Started on flomax  Voiding  trial in 5-7 days  Monitor creat     Corneal abrasion, right   Assessment & Plan    -Post surgery, likely due to anesthesia  -Discussed with patient to avoid touching eye or rubbing  -now resolved     Hypokalemia- (present on admission)   Assessment & Plan    Resolved     Overweight (BMI 25.0-29.9)- (present on admission)   Assessment & Plan    Body mass index is 29.76 kg/m².       Cirrhosis (HCC)- (present on admission)   Assessment & Plan    History of  Without evidence of end-stage disease.  Unclear etiology.  Outpatient follow-up           VTE prophylaxis: lovenox

## 2018-12-18 NOTE — PROGRESS NOTES
Received shift report from gonzalez RN and assumed care of this pt at 0715. Pt AOx4 . Pt reports pain is 3/10 to LLE surgical site - Recently medicated prn per MAR. Pt need to work with PT and OT to assess, . Pt appropriately with call light when needing assistance. Bed alarm is on. PIV assessed and is patent, CDI, SL. Pt is on 2 liter.  Discussed POC for day shift,  comfort, and safety. Patient has call light and personal belongings within reach. Safety and fall precautions in place. Reviewed orders, notes, labs, and test results. Hourly rounding in place with RN rounding on odd hours and CNA on even hours.  Pt compliant with being moved to new room (316-2)

## 2018-12-19 ENCOUNTER — APPOINTMENT (OUTPATIENT)
Dept: RADIOLOGY | Facility: MEDICAL CENTER | Age: 73
DRG: 470 | End: 2018-12-19
Attending: INTERNAL MEDICINE
Payer: MEDICARE

## 2018-12-19 PROBLEM — E87.6 HYPOKALEMIA: Status: RESOLVED | Noted: 2018-12-16 | Resolved: 2018-12-19

## 2018-12-19 PROBLEM — S05.01XA CORNEAL ABRASION, RIGHT: Status: RESOLVED | Noted: 2018-12-16 | Resolved: 2018-12-19

## 2018-12-19 LAB
ALP SERPL-CCNC: 81 U/L (ref 30–99)
ALT SERPL-CCNC: 12 U/L (ref 2–50)
ANION GAP SERPL CALC-SCNC: 7 MMOL/L (ref 0–11.9)
APPEARANCE UR: CLEAR
AST SERPL-CCNC: 31 U/L (ref 12–45)
BACTERIA #/AREA URNS HPF: NEGATIVE /HPF
BASOPHILS # BLD AUTO: 0.4 % (ref 0–1.8)
BASOPHILS # BLD: 0.05 K/UL (ref 0–0.12)
BILIRUB SERPL-MCNC: 0.8 MG/DL (ref 0.1–1.5)
BILIRUB UR QL STRIP.AUTO: NEGATIVE
BUN SERPL-MCNC: 10 MG/DL (ref 8–22)
CALCIUM SERPL-MCNC: 8.9 MG/DL (ref 8.5–10.5)
CHLORIDE SERPL-SCNC: 101 MMOL/L (ref 96–112)
CO2 SERPL-SCNC: 26 MMOL/L (ref 20–33)
COLOR UR: YELLOW
CREAT SERPL-MCNC: 0.99 MG/DL (ref 0.5–1.4)
EOSINOPHIL # BLD AUTO: 0.11 K/UL (ref 0–0.51)
EOSINOPHIL NFR BLD: 0.8 % (ref 0–6.9)
EPI CELLS #/AREA URNS HPF: NEGATIVE /HPF
ERYTHROCYTE [DISTWIDTH] IN BLOOD BY AUTOMATED COUNT: 55.5 FL (ref 35.9–50)
GLUCOSE SERPL-MCNC: 174 MG/DL (ref 65–99)
GLUCOSE UR STRIP.AUTO-MCNC: NEGATIVE MG/DL
HCT VFR BLD AUTO: 33.7 % (ref 37–47)
HGB BLD-MCNC: 10.9 G/DL (ref 12–16)
HYALINE CASTS #/AREA URNS LPF: ABNORMAL /LPF
IMM GRANULOCYTES # BLD AUTO: 0.05 K/UL (ref 0–0.11)
IMM GRANULOCYTES NFR BLD AUTO: 0.4 % (ref 0–0.9)
KETONES UR STRIP.AUTO-MCNC: NEGATIVE MG/DL
LEUKOCYTE ESTERASE UR QL STRIP.AUTO: ABNORMAL
LYMPHOCYTES # BLD AUTO: 1.5 K/UL (ref 1–4.8)
LYMPHOCYTES NFR BLD: 11.4 % (ref 22–41)
MCH RBC QN AUTO: 33.4 PG (ref 27–33)
MCHC RBC AUTO-ENTMCNC: 32.3 G/DL (ref 33.6–35)
MCV RBC AUTO: 103.4 FL (ref 81.4–97.8)
MICRO URNS: ABNORMAL
MONOCYTES # BLD AUTO: 0.75 K/UL (ref 0–0.85)
MONOCYTES NFR BLD AUTO: 5.7 % (ref 0–13.4)
NEUTROPHILS # BLD AUTO: 10.73 K/UL (ref 2–7.15)
NEUTROPHILS NFR BLD: 81.3 % (ref 44–72)
NITRITE UR QL STRIP.AUTO: NEGATIVE
NRBC # BLD AUTO: 0 K/UL
NRBC BLD-RTO: 0 /100 WBC
PH UR STRIP.AUTO: 7 [PH]
PLATELET # BLD AUTO: 113 K/UL (ref 164–446)
PMV BLD AUTO: 11.3 FL (ref 9–12.9)
POTASSIUM SERPL-SCNC: 3.4 MMOL/L (ref 3.6–5.5)
PROT UR QL STRIP: NEGATIVE MG/DL
RBC # BLD AUTO: 3.26 M/UL (ref 4.2–5.4)
RBC # URNS HPF: ABNORMAL /HPF
RBC UR QL AUTO: ABNORMAL
SODIUM SERPL-SCNC: 134 MMOL/L (ref 135–145)
SP GR UR STRIP.AUTO: 1.01
UROBILINOGEN UR STRIP.AUTO-MCNC: 0.2 MG/DL
WBC # BLD AUTO: 13.2 K/UL (ref 4.8–10.8)
WBC #/AREA URNS HPF: ABNORMAL /HPF

## 2018-12-19 PROCEDURE — 84460 ALANINE AMINO (ALT) (SGPT): CPT

## 2018-12-19 PROCEDURE — 36415 COLL VENOUS BLD VENIPUNCTURE: CPT

## 2018-12-19 PROCEDURE — 84075 ASSAY ALKALINE PHOSPHATASE: CPT

## 2018-12-19 PROCEDURE — 82247 BILIRUBIN TOTAL: CPT

## 2018-12-19 PROCEDURE — 700102 HCHG RX REV CODE 250 W/ 637 OVERRIDE(OP): Performed by: HOSPITALIST

## 2018-12-19 PROCEDURE — 87040 BLOOD CULTURE FOR BACTERIA: CPT

## 2018-12-19 PROCEDURE — 81001 URINALYSIS AUTO W/SCOPE: CPT

## 2018-12-19 PROCEDURE — 84450 TRANSFERASE (AST) (SGOT): CPT

## 2018-12-19 PROCEDURE — 700111 HCHG RX REV CODE 636 W/ 250 OVERRIDE (IP): Performed by: ORTHOPAEDIC SURGERY

## 2018-12-19 PROCEDURE — 80048 BASIC METABOLIC PNL TOTAL CA: CPT

## 2018-12-19 PROCEDURE — 700102 HCHG RX REV CODE 250 W/ 637 OVERRIDE(OP): Performed by: INTERNAL MEDICINE

## 2018-12-19 PROCEDURE — 85025 COMPLETE CBC W/AUTO DIFF WBC: CPT

## 2018-12-19 PROCEDURE — 700102 HCHG RX REV CODE 250 W/ 637 OVERRIDE(OP): Performed by: PHYSICIAN ASSISTANT

## 2018-12-19 PROCEDURE — A9270 NON-COVERED ITEM OR SERVICE: HCPCS | Performed by: PHYSICIAN ASSISTANT

## 2018-12-19 PROCEDURE — 770006 HCHG ROOM/CARE - MED/SURG/GYN SEMI*

## 2018-12-19 PROCEDURE — 71045 X-RAY EXAM CHEST 1 VIEW: CPT

## 2018-12-19 PROCEDURE — 97530 THERAPEUTIC ACTIVITIES: CPT

## 2018-12-19 PROCEDURE — A9270 NON-COVERED ITEM OR SERVICE: HCPCS | Performed by: INTERNAL MEDICINE

## 2018-12-19 PROCEDURE — 87086 URINE CULTURE/COLONY COUNT: CPT

## 2018-12-19 PROCEDURE — 99232 SBSQ HOSP IP/OBS MODERATE 35: CPT | Performed by: INTERNAL MEDICINE

## 2018-12-19 PROCEDURE — A9270 NON-COVERED ITEM OR SERVICE: HCPCS | Performed by: HOSPITALIST

## 2018-12-19 RX ORDER — SULFAMETHOXAZOLE AND TRIMETHOPRIM 800; 160 MG/1; MG/1
1 TABLET ORAL EVERY 12 HOURS
Status: DISCONTINUED | OUTPATIENT
Start: 2018-12-19 | End: 2018-12-21

## 2018-12-19 RX ORDER — POTASSIUM CHLORIDE 20 MEQ/1
40 TABLET, EXTENDED RELEASE ORAL ONCE
Status: COMPLETED | OUTPATIENT
Start: 2018-12-19 | End: 2018-12-19

## 2018-12-19 RX ADMIN — BUPROPION HYDROCHLORIDE 75 MG: 75 TABLET, FILM COATED ORAL at 06:32

## 2018-12-19 RX ADMIN — GABAPENTIN 300 MG: 300 CAPSULE ORAL at 16:54

## 2018-12-19 RX ADMIN — ALLOPURINOL 300 MG: 300 TABLET ORAL at 06:32

## 2018-12-19 RX ADMIN — TRAMADOL HYDROCHLORIDE 100 MG: 50 TABLET, FILM COATED ORAL at 06:32

## 2018-12-19 RX ADMIN — TIOTROPIUM BROMIDE 1 CAPSULE: 18 CAPSULE ORAL; RESPIRATORY (INHALATION) at 06:32

## 2018-12-19 RX ADMIN — SPIRONOLACTONE 100 MG: 25 TABLET, FILM COATED ORAL at 06:32

## 2018-12-19 RX ADMIN — TAMSULOSIN HYDROCHLORIDE 0.4 MG: 0.4 CAPSULE ORAL at 08:44

## 2018-12-19 RX ADMIN — OXYCODONE HYDROCHLORIDE 5 MG: 5 TABLET ORAL at 22:53

## 2018-12-19 RX ADMIN — GABAPENTIN 300 MG: 300 CAPSULE ORAL at 06:32

## 2018-12-19 RX ADMIN — SULFAMETHOXAZOLE AND TRIMETHOPRIM 1 TABLET: 800; 160 TABLET ORAL at 16:54

## 2018-12-19 RX ADMIN — GABAPENTIN 300 MG: 300 CAPSULE ORAL at 21:36

## 2018-12-19 RX ADMIN — ENOXAPARIN SODIUM 40 MG: 100 INJECTION SUBCUTANEOUS at 21:02

## 2018-12-19 RX ADMIN — LEVOTHYROXINE SODIUM 137 MCG: 137 TABLET ORAL at 06:32

## 2018-12-19 RX ADMIN — STANDARDIZED SENNA CONCENTRATE AND DOCUSATE SODIUM 2 TABLET: 8.6; 5 TABLET, FILM COATED ORAL at 06:32

## 2018-12-19 RX ADMIN — POTASSIUM CHLORIDE 40 MEQ: 1500 TABLET, EXTENDED RELEASE ORAL at 11:25

## 2018-12-19 RX ADMIN — VALSARTAN 160 MG: 80 TABLET, FILM COATED ORAL at 06:32

## 2018-12-19 RX ADMIN — TRAMADOL HYDROCHLORIDE 100 MG: 50 TABLET, FILM COATED ORAL at 21:02

## 2018-12-19 ASSESSMENT — PAIN SCALES - GENERAL
PAINLEVEL_OUTOF10: 6
PAINLEVEL_OUTOF10: 2
PAINLEVEL_OUTOF10: 4
PAINLEVEL_OUTOF10: 2
PAINLEVEL_OUTOF10: 4
PAINLEVEL_OUTOF10: 5
PAINLEVEL_OUTOF10: 0
PAINLEVEL_OUTOF10: 3

## 2018-12-19 ASSESSMENT — ENCOUNTER SYMPTOMS
FEVER: 0
SHORTNESS OF BREATH: 0
COUGH: 0
HEADACHES: 0
SORE THROAT: 1
ABDOMINAL PAIN: 0
NAUSEA: 0
CONSTIPATION: 1
CHILLS: 0
VOMITING: 0

## 2018-12-19 ASSESSMENT — GAIT ASSESSMENTS
DISTANCE (FEET): 3
ASSISTIVE DEVICE: FRONT WHEEL WALKER
GAIT LEVEL OF ASSIST: MODERATE ASSIST

## 2018-12-19 ASSESSMENT — COGNITIVE AND FUNCTIONAL STATUS - GENERAL
SUGGESTED CMS G CODE MODIFIER MOBILITY: CM
STANDING UP FROM CHAIR USING ARMS: A LOT
MOVING FROM LYING ON BACK TO SITTING ON SIDE OF FLAT BED: A LOT
TURNING FROM BACK TO SIDE WHILE IN FLAT BAD: UNABLE
CLIMB 3 TO 5 STEPS WITH RAILING: TOTAL
WALKING IN HOSPITAL ROOM: A LOT
MOBILITY SCORE: 9
MOVING TO AND FROM BED TO CHAIR: UNABLE

## 2018-12-19 NOTE — DISCHARGE PLANNING
Agency/Facility Name: [facility]  Spoke To: Jamia  Outcome: Pending review, will call this CCA back.

## 2018-12-19 NOTE — PROGRESS NOTES
"   Orthopaedic PA Progress Note    Interval changes:Resting comfortably in chair, OK to transfer from Ortho standpoint    ROS - Patient denies any new issues. No chest pain, dyspnea, or fever.  Pain well controlled.    Blood pressure 122/63, pulse 86, temperature 36.3 °C (97.3 °F), temperature source Temporal, resp. rate 17, height 1.6 m (5' 3\"), weight 74.5 kg (164 lb 3.9 oz), SpO2 93 %, not currently breastfeeding.    Patient seen and examined  No acute distress  Breathing non labored  RRR  Surgical dressing is clean, dry, and intact. Patient clearly fires tibialis anterior, EHL, and gastrocnemius/soleus. Sensation is intact to light touch throughout superficial peroneal, deep peroneal, tibial, saphenous, and sural nerve distributions. Strong and palpable 2+ dorsalis pedis and posterior tibial pulses with capillary refill less than 2 seconds. No lower leg tenderness or discomfort.    Recent Labs      12/17/18   0603  12/18/18   1115  12/19/18   0915   WBC  14.5*  12.9*  13.2*   RBC  3.70*  3.57*  3.26*   HEMOGLOBIN  12.4  12.2  10.9*   HEMATOCRIT  38.5  36.9*  33.7*   MCV  104.1*  103.4*  103.4*   MCH  33.5*  34.2*  33.4*   MCHC  32.2*  33.1*  32.3*   RDW  57.7*  56.8*  55.5*   PLATELETCT  124*  112*  113*   MPV  10.9  11.2  11.3       Active Hospital Problems    Diagnosis   • Essential hypertension [I10]     Priority: High     ICD-10 transition     • Urinary retention [R33.9]   • Leukocytosis [D72.829]   • Hypokalemia [E87.6]   • Corneal abrasion, right [S05.01XA]   • Overweight (BMI 25.0-29.9) [E66.3]   • Closed fracture of neck of left femur (HCC) [S72.002A]   • Cirrhosis (HCC) [K74.60]     Assessment/Plan:  Pain better controlled today  POD#3 S/P left hip hemiarthroplasty  Wt bearing status - WBAT  Wound care/Drains - dressing left in place  Future Procedures - none planned   Sutures/Staples out- 10-14 days post operatively  PT/OT-initiated  DVT Prophylaxis- TEDS/SCDs/Foot pumps/lovenox  Mendiola-none  Case " Coordination for Discharge Planning - Disposition SNF

## 2018-12-19 NOTE — PROGRESS NOTES
[facility] Medicine Daily Progress Note    Date of Service  12/19/2018    Chief Complaint  73 y.o. female admitted 12/15/2018 with fall.    Hospital Course    PMH HTN, HLD, cirrhosis who presented with mechanical fall and found with left femoral neck fracture. Dr. Alcala with ortho was consulted and patient had left hip hemiarthroplasty 12/16.        Interval Problem Update  Leukocytosis rising. Patient has mild sore throat. Will check UA, CXR, blood cultures. Has mild RUQ tenderness, check LFTs  Surgical site is clean and intact  Creatinine improved. Mendiola cath in place    Consultants/Specialty  Ortho    Code Status  Full    Disposition  SNF  Sutures/Staples out- 10-14 days post operatively  WBAT  Mendiola cath    Review of Systems  Review of Systems   Constitutional: Negative for chills and fever.   HENT: Positive for sore throat. Negative for congestion.    Respiratory: Negative for cough and shortness of breath.    Cardiovascular: Negative for chest pain.   Gastrointestinal: Positive for constipation. Negative for abdominal pain, nausea and vomiting.   Genitourinary: Negative for dysuria and urgency.   Musculoskeletal: Positive for joint pain.   Skin: Negative for itching.   Neurological: Negative for headaches.        Physical Exam  Temp:  [36.2 °C (97.2 °F)-36.6 °C (97.8 °F)] 36.3 °C (97.3 °F)  Pulse:  [68-86] 86  Resp:  [16-17] 17  BP: ()/(57-63) 122/63    Physical Exam   Constitutional: She is oriented to person, place, and time. She appears well-developed and well-nourished.   HENT:   Head: Normocephalic.   Mouth/Throat: Oropharynx is clear and moist. No oropharyngeal exudate.   dry mucus membranes  Oropharynx without lesions or exudates   Eyes: Conjunctivae are normal. Right eye exhibits no discharge. Left eye exhibits no discharge.   Cardiovascular: Normal rate and regular rhythm.    Pulmonary/Chest: Effort normal. She has no wheezes. She has no rales.   Abdominal: Soft. There is no rebound and no  guarding.   RUQ tenderness   Musculoskeletal: She exhibits no edema.   No joint swelling of ankles, knees, elbows  Left hip with clean dressing in place   Lymphadenopathy:     She has no cervical adenopathy.   Neurological: She is alert and oriented to person, place, and time.   Skin: Skin is warm and dry.   Nursing note and vitals reviewed.      Fluids    Intake/Output Summary (Last 24 hours) at 12/19/18 1320  Last data filed at 12/19/18 0900   Gross per 24 hour   Intake                0 ml   Output             1550 ml   Net            -1550 ml       Laboratory  Recent Labs      12/17/18   0603  12/18/18   1115  12/19/18   0915   WBC  14.5*  12.9*  13.2*   RBC  3.70*  3.57*  3.26*   HEMOGLOBIN  12.4  12.2  10.9*   HEMATOCRIT  38.5  36.9*  33.7*   MCV  104.1*  103.4*  103.4*   MCH  33.5*  34.2*  33.4*   MCHC  32.2*  33.1*  32.3*   RDW  57.7*  56.8*  55.5*   PLATELETCT  124*  112*  113*   MPV  10.9  11.2  11.3     Recent Labs      12/17/18   0431  12/18/18   1115  12/19/18   0915   SODIUM  135  135  134*   POTASSIUM  4.7  3.6  3.4*   CHLORIDE  106  104  101   CO2  23  26  26   GLUCOSE  72  133*  174*   BUN  15  15  10   CREATININE  1.32  1.26  0.99   CALCIUM  8.2*  8.7  8.9                   Imaging  DX-CHEST-PORTABLE (1 VIEW)   Final Result      Mild cardiomegaly. No focal consolidation or pleural effusions.      DX-PELVIS-1 OR 2 VIEWS   Final Result      Satisfactory positioning, in AP view, of new left hip hemiarthroplasty.      DX-PELVIS-1 OR 2 VIEWS   Final Result      1.  LEFT femoral neck fracture with displacement and varus stimulation.   2.  No pelvic fracture demonstrated.      DX-FEMUR-1 VIEW LEFT   Final Result      Acute displaced fracture of the LEFT femoral neck with varus angulation.      DX-CHEST-LIMITED (1 VIEW)   Final Result      No acute cardiopulmonary disease.           Assessment/Plan  * Closed fracture of neck of left femur (HCC)- (present on admission)   Assessment & Plan    S/p repair with  Dr. Alcala  PTOT  Pain control     Essential hypertension- (present on admission)   Assessment & Plan    Controlled with current medication regimen.  Monitor.      Leukocytosis   Assessment & Plan    Noted on admission  Leukocytosis rising.   CXR had mild cardiomegaly  Blood cultures collected  UA had some WBCs. Will culture urine and start on empiric antibiotics pending results     Urinary retention   Assessment & Plan    May be contributing to increase creatinine, improving  Mendiola cath placed  Started on flomax  Voiding trial in 5-7 days     Overweight (BMI 25.0-29.9)- (present on admission)   Assessment & Plan    Body mass index is 29.76 kg/m².       Cirrhosis (HCC)- (present on admission)   Assessment & Plan    History of  Without evidence of end-stage disease.  Unclear etiology.  Outpatient follow-up           VTE prophylaxis: lovenox

## 2018-12-19 NOTE — PROGRESS NOTES
· 2 RN skin check complete with Xochitl STRICKLAND.  · Devices in place SCD's, Oxygen tubing.  · Skin assessed under devices intact.  · Confirmed pressure ulcers found on sacrum intact with blanchable skin, Edema LLE2.  · The following interventions in place waffle over lay, q2 turns, silicon NC in use, foam tubing around ears for protection, heels floated with pillows.

## 2018-12-19 NOTE — CARE PLAN
Problem: Safety  Goal: Will remain free from falls  Outcome: PROGRESSING AS EXPECTED  Treaded socks in place, bed in the lowest position, bed alarm on, call light and belongings within reach, pt call for assistance appropriately    Problem: Skin Integrity  Goal: Risk for impaired skin integrity will decrease  Outcome: PROGRESSING AS EXPECTED  q2 turns, kevin risk assessment, applied barrier cream

## 2018-12-19 NOTE — THERAPY
"Pt progressing slowly. Pt exhibited increased strength, standing ability, and wt shifiting. Pt w/improved pain tolerance and able to participate more. Pt appears anxious at times and requires remiders to slow down. Pt demonstrated fair side stepping and wt shifting for pregait. Pt would benefit from further acute PT txs to progress towards goals and independence. Recommend post acute placement at an inpatient transitional care facility to address deficits.    Physical Therapy Treatment completed.   Bed Mobility:  Supine to Sit: Minimal Assist  Transfers: Sit to Stand: Minimal Assist (x2 people for safety)  Gait: Level Of Assist: Moderate Assist with Front-Wheel Walker       Plan of Care: Will benefit from Physical Therapy 5 times per week    See \"Rehab Therapy-Acute\" Patient Summary Report for complete documentation.       "

## 2018-12-19 NOTE — DISCHARGE PLANNING
Agency/Facility Name: [facility]  Spoke To: Jamia  Outcome: Accepted as long as patient is ok with smoking policy

## 2018-12-19 NOTE — THERAPY
"Attempted to see pt for PT tx. Pt able to attempt partial bed mobility though highly limited by pain and multiple BMs. Spent most of session rolling pt for pericare needs w/CNA assist. Session terminated as pt BMs were continuing and pt unable to give full effort, as she was in a lot of pain and awaiting correct pain meds. Will try to reattempt later for a more meaningful session and as able.    Physical Therapy Treatment completed.   Bed Mobility:  Supine to Sit: Maximal Assist (partial supine>sit)  Transfers: Sit to Stand: Unable to Participate  Gait: Level Of Assist: Unable to Participate        Plan of Care: Will benefit from Physical Therapy 5 times per week    See \"Rehab Therapy-Acute\" Patient Summary Report for complete documentation.       "

## 2018-12-20 PROBLEM — K59.03 DRUG-INDUCED CONSTIPATION: Status: ACTIVE | Noted: 2018-12-20

## 2018-12-20 LAB
ALBUMIN SERPL BCP-MCNC: 3.3 G/DL (ref 3.2–4.9)
ALBUMIN/GLOB SERPL: 1.2 G/DL
ALP SERPL-CCNC: 93 U/L (ref 30–99)
ALT SERPL-CCNC: 14 U/L (ref 2–50)
AMMONIA PLAS-SCNC: 29 UMOL/L (ref 11–45)
ANION GAP SERPL CALC-SCNC: 8 MMOL/L (ref 0–11.9)
AST SERPL-CCNC: 43 U/L (ref 12–45)
BASOPHILS # BLD AUTO: 0.2 % (ref 0–1.8)
BASOPHILS # BLD: 0.03 K/UL (ref 0–0.12)
BILIRUB SERPL-MCNC: 0.9 MG/DL (ref 0.1–1.5)
BUN SERPL-MCNC: 11 MG/DL (ref 8–22)
CALCIUM SERPL-MCNC: 9 MG/DL (ref 8.5–10.5)
CHLORIDE SERPL-SCNC: 101 MMOL/L (ref 96–112)
CO2 SERPL-SCNC: 24 MMOL/L (ref 20–33)
CREAT SERPL-MCNC: 0.95 MG/DL (ref 0.5–1.4)
EOSINOPHIL # BLD AUTO: 0.13 K/UL (ref 0–0.51)
EOSINOPHIL NFR BLD: 0.9 % (ref 0–6.9)
ERYTHROCYTE [DISTWIDTH] IN BLOOD BY AUTOMATED COUNT: 57 FL (ref 35.9–50)
GLOBULIN SER CALC-MCNC: 2.8 G/DL (ref 1.9–3.5)
GLUCOSE SERPL-MCNC: 100 MG/DL (ref 65–99)
HCT VFR BLD AUTO: 36 % (ref 37–47)
HGB BLD-MCNC: 11.8 G/DL (ref 12–16)
IMM GRANULOCYTES # BLD AUTO: 0.07 K/UL (ref 0–0.11)
IMM GRANULOCYTES NFR BLD AUTO: 0.5 % (ref 0–0.9)
LYMPHOCYTES # BLD AUTO: 1.98 K/UL (ref 1–4.8)
LYMPHOCYTES NFR BLD: 13.7 % (ref 22–41)
MCH RBC QN AUTO: 34.3 PG (ref 27–33)
MCHC RBC AUTO-ENTMCNC: 32.8 G/DL (ref 33.6–35)
MCV RBC AUTO: 104.7 FL (ref 81.4–97.8)
MONOCYTES # BLD AUTO: 1.1 K/UL (ref 0–0.85)
MONOCYTES NFR BLD AUTO: 7.6 % (ref 0–13.4)
NEUTROPHILS # BLD AUTO: 11.1 K/UL (ref 2–7.15)
NEUTROPHILS NFR BLD: 77.1 % (ref 44–72)
NRBC # BLD AUTO: 0 K/UL
NRBC BLD-RTO: 0 /100 WBC
PLATELET # BLD AUTO: 122 K/UL (ref 164–446)
PMV BLD AUTO: 12 FL (ref 9–12.9)
POTASSIUM SERPL-SCNC: 3.7 MMOL/L (ref 3.6–5.5)
PROT SERPL-MCNC: 6.1 G/DL (ref 6–8.2)
RBC # BLD AUTO: 3.44 M/UL (ref 4.2–5.4)
SODIUM SERPL-SCNC: 133 MMOL/L (ref 135–145)
WBC # BLD AUTO: 14.4 K/UL (ref 4.8–10.8)

## 2018-12-20 PROCEDURE — 97535 SELF CARE MNGMENT TRAINING: CPT

## 2018-12-20 PROCEDURE — 80053 COMPREHEN METABOLIC PANEL: CPT

## 2018-12-20 PROCEDURE — 82140 ASSAY OF AMMONIA: CPT

## 2018-12-20 PROCEDURE — 700102 HCHG RX REV CODE 250 W/ 637 OVERRIDE(OP): Performed by: INTERNAL MEDICINE

## 2018-12-20 PROCEDURE — A9270 NON-COVERED ITEM OR SERVICE: HCPCS | Performed by: INTERNAL MEDICINE

## 2018-12-20 PROCEDURE — 700102 HCHG RX REV CODE 250 W/ 637 OVERRIDE(OP): Performed by: PHYSICIAN ASSISTANT

## 2018-12-20 PROCEDURE — 97530 THERAPEUTIC ACTIVITIES: CPT

## 2018-12-20 PROCEDURE — 700102 HCHG RX REV CODE 250 W/ 637 OVERRIDE(OP): Performed by: HOSPITALIST

## 2018-12-20 PROCEDURE — 700111 HCHG RX REV CODE 636 W/ 250 OVERRIDE (IP): Performed by: ORTHOPAEDIC SURGERY

## 2018-12-20 PROCEDURE — 85025 COMPLETE CBC W/AUTO DIFF WBC: CPT

## 2018-12-20 PROCEDURE — A9270 NON-COVERED ITEM OR SERVICE: HCPCS | Performed by: PHYSICIAN ASSISTANT

## 2018-12-20 PROCEDURE — A9270 NON-COVERED ITEM OR SERVICE: HCPCS | Performed by: HOSPITALIST

## 2018-12-20 PROCEDURE — 770006 HCHG ROOM/CARE - MED/SURG/GYN SEMI*

## 2018-12-20 PROCEDURE — 36415 COLL VENOUS BLD VENIPUNCTURE: CPT

## 2018-12-20 PROCEDURE — 99232 SBSQ HOSP IP/OBS MODERATE 35: CPT | Performed by: INTERNAL MEDICINE

## 2018-12-20 RX ORDER — ACETAMINOPHEN 325 MG/1
650 TABLET ORAL EVERY 6 HOURS PRN
Status: DISCONTINUED | OUTPATIENT
Start: 2018-12-20 | End: 2018-12-26 | Stop reason: HOSPADM

## 2018-12-20 RX ORDER — OXYCODONE HYDROCHLORIDE 5 MG/1
2.5 TABLET ORAL EVERY 4 HOURS PRN
Status: DISCONTINUED | OUTPATIENT
Start: 2018-12-20 | End: 2018-12-26

## 2018-12-20 RX ORDER — GABAPENTIN 100 MG/1
200 CAPSULE ORAL 3 TIMES DAILY
Status: DISCONTINUED | OUTPATIENT
Start: 2018-12-20 | End: 2018-12-26 | Stop reason: HOSPADM

## 2018-12-20 RX ORDER — POLYETHYLENE GLYCOL 3350 17 G/17G
1 POWDER, FOR SOLUTION ORAL DAILY
Status: DISCONTINUED | OUTPATIENT
Start: 2018-12-20 | End: 2018-12-26 | Stop reason: HOSPADM

## 2018-12-20 RX ORDER — ACETAMINOPHEN 325 MG/1
650 TABLET ORAL EVERY 6 HOURS
Status: DISCONTINUED | OUTPATIENT
Start: 2018-12-20 | End: 2018-12-20

## 2018-12-20 RX ADMIN — OXYCODONE HYDROCHLORIDE 5 MG: 5 TABLET ORAL at 09:27

## 2018-12-20 RX ADMIN — BUPROPION HYDROCHLORIDE 75 MG: 75 TABLET, FILM COATED ORAL at 05:04

## 2018-12-20 RX ADMIN — GABAPENTIN 300 MG: 300 CAPSULE ORAL at 14:02

## 2018-12-20 RX ADMIN — POLYETHYLENE GLYCOL 3350 1 PACKET: 17 POWDER, FOR SOLUTION ORAL at 14:02

## 2018-12-20 RX ADMIN — OXYCODONE HYDROCHLORIDE 5 MG: 5 TABLET ORAL at 05:04

## 2018-12-20 RX ADMIN — STANDARDIZED SENNA CONCENTRATE AND DOCUSATE SODIUM 2 TABLET: 8.6; 5 TABLET, FILM COATED ORAL at 05:05

## 2018-12-20 RX ADMIN — SULFAMETHOXAZOLE AND TRIMETHOPRIM 1 TABLET: 800; 160 TABLET ORAL at 17:26

## 2018-12-20 RX ADMIN — GABAPENTIN 300 MG: 300 CAPSULE ORAL at 05:05

## 2018-12-20 RX ADMIN — SPIRONOLACTONE 100 MG: 25 TABLET, FILM COATED ORAL at 05:05

## 2018-12-20 RX ADMIN — SULFAMETHOXAZOLE AND TRIMETHOPRIM 1 TABLET: 800; 160 TABLET ORAL at 05:05

## 2018-12-20 RX ADMIN — LEVOTHYROXINE SODIUM 137 MCG: 137 TABLET ORAL at 05:04

## 2018-12-20 RX ADMIN — TIOTROPIUM BROMIDE 1 CAPSULE: 18 CAPSULE ORAL; RESPIRATORY (INHALATION) at 05:06

## 2018-12-20 RX ADMIN — VALSARTAN 160 MG: 80 TABLET, FILM COATED ORAL at 05:04

## 2018-12-20 RX ADMIN — GABAPENTIN 200 MG: 100 CAPSULE ORAL at 17:26

## 2018-12-20 RX ADMIN — TAMSULOSIN HYDROCHLORIDE 0.4 MG: 0.4 CAPSULE ORAL at 09:27

## 2018-12-20 RX ADMIN — STANDARDIZED SENNA CONCENTRATE AND DOCUSATE SODIUM 2 TABLET: 8.6; 5 TABLET, FILM COATED ORAL at 17:26

## 2018-12-20 RX ADMIN — ALLOPURINOL 300 MG: 300 TABLET ORAL at 05:04

## 2018-12-20 RX ADMIN — ENOXAPARIN SODIUM 40 MG: 100 INJECTION SUBCUTANEOUS at 19:48

## 2018-12-20 ASSESSMENT — ENCOUNTER SYMPTOMS
CONSTIPATION: 1
FEVER: 0
VOMITING: 0
NAUSEA: 0
SHORTNESS OF BREATH: 0
HEADACHES: 0
ABDOMINAL PAIN: 0
CHILLS: 0
COUGH: 0

## 2018-12-20 ASSESSMENT — COGNITIVE AND FUNCTIONAL STATUS - GENERAL
MOBILITY SCORE: 9
SUGGESTED CMS G CODE MODIFIER DAILY ACTIVITY: CK
TURNING FROM BACK TO SIDE WHILE IN FLAT BAD: UNABLE
SUGGESTED CMS G CODE MODIFIER MOBILITY: CM
DRESSING REGULAR UPPER BODY CLOTHING: A LITTLE
CLIMB 3 TO 5 STEPS WITH RAILING: TOTAL
DAILY ACTIVITIY SCORE: 16
DRESSING REGULAR LOWER BODY CLOTHING: A LOT
MOVING TO AND FROM BED TO CHAIR: UNABLE
HELP NEEDED FOR BATHING: A LOT
MOVING FROM LYING ON BACK TO SITTING ON SIDE OF FLAT BED: A LOT
TOILETING: TOTAL
STANDING UP FROM CHAIR USING ARMS: A LOT
WALKING IN HOSPITAL ROOM: A LOT

## 2018-12-20 ASSESSMENT — PAIN SCALES - GENERAL
PAINLEVEL_OUTOF10: 3
PAINLEVEL_OUTOF10: 4
PAINLEVEL_OUTOF10: 5
PAINLEVEL_OUTOF10: 6
PAINLEVEL_OUTOF10: 4
PAINLEVEL_OUTOF10: 5

## 2018-12-20 ASSESSMENT — GAIT ASSESSMENTS
DISTANCE (FEET): 3
GAIT LEVEL OF ASSIST: CONTACT GUARD ASSIST
ASSISTIVE DEVICE: FRONT WHEEL WALKER

## 2018-12-20 NOTE — ASSESSMENT & PLAN NOTE
Scheduled tylenol & Gabapentin   PRN tramadol   Try to minimize narcotic use  Senakot BID and daily miralax

## 2018-12-20 NOTE — THERAPY
"Pt w/impaired fucntional mobility. Pt limited by pain, fatigue, and stomach ache. Pt able to take a few steps from recliner abck to bed, requiring verbal cuing to use walker at all times, w/verbal ciung and demonstration. Pt w/poor wt shifting for funcitonal amb, though appears to be progressing. Pt would benefit from further acute PT txs to progress towards goals and independence. Recommend post acute placement at an inpatient transitional care facility to address deficits.    Physical Therapy Treatment completed.   Bed Mobility:  Supine to Sit:  (NT--found up in chair)  Transfers: Sit to Stand: Contact Guard Assist  Gait: Level Of Assist: Contact Guard Assist with Front-Wheel Walker 3ft      Plan of Care: Will benefit from Physical Therapy 5 times per week    See \"Rehab Therapy-Acute\" Patient Summary Report for complete documentation.       "

## 2018-12-20 NOTE — CARE PLAN
Problem: Infection  Goal: Will remain free from infection  Outcome: PROGRESSING AS EXPECTED  No s/s of infection. Hands are washed before and after entering the room.     Problem: Pain Management  Goal: Pain level will decrease to patient's comfort goal  Outcome: PROGRESSING AS EXPECTED  Most of the time the pts pain is in a tolerable range. The pts pain medications help take her pain back down to a tolerable range.

## 2018-12-20 NOTE — THERAPY
"Occupational Therapy Treatment completed with focus on ADLs, ADL transfers and patient education.  Functional Status:  Pt seen for OT tx today, cooperative, motivated to participate in therapy, slightly impulsive with decreased safety awareness. Pt performed STS from eob(raised height of bed) with cga and cues for upright posture to prevent excessive trunk flexion to come to standing with FWW, with initial attempt to ambulation, pt had LLE buckling required min/mod a from therapist to correct, reinforced safety and gradual progress with mobility once oob; stated understanding. Participated in STS eob x6 with wt-shifiting to Rt and Lt, initially required mod a to facilitate wb through LLE and was able to gradually progress to cga, seated UB ADLs with sba after s/u, max a for LB ADLs, eob->chair with min a. Pt will continue to benefit from acute skilled OT services while in house to address underlying deficits, ADL modifications and AE training.   Plan of Care: Will benefit from Occupational Therapy 4 times per week  Discharge Recommendations:  Equipment Will Continue to Assess for Equipment Needs. Post-acute therapy Recommend inpatient transitional care services for continued occupational therapy services.     See \"Rehab Therapy-Acute\" Patient Summary Report for complete documentation.     "

## 2018-12-20 NOTE — PROGRESS NOTES
Hospital Medicine Daily Progress Note    Date of Service  12/20/2018    Chief Complaint  73 y.o. female admitted 12/15/2018 with fall.    Hospital Course    PMH HTN, HLD, cirrhosis who presented with mechanical fall and found with left femoral neck fracture. Dr. Alcala with ortho was consulted and patient had left hip hemiarthroplasty 12/16.        Interval Problem Update  Persistent leukocytosis. Started on bactrim for possible UTI, culture pending. CXR was normal. LFTs normal. Blood cultures pending  Patient a bit more confused today, somnolent throughout the day. Per niece, patient was acting this way prior to hospitalization? Will decrease gabapentin. Will check ammonia level  I spoke with her daughter in law Krys for updates.   Still has not had BM, added scheduled miralax    Consultants/Specialty  Ortho    Code Status  Full    Disposition  SNF - [facility]  Sutures/Staples out- 10-14 days post operatively  WBAT  Mendiola cath    Review of Systems  Review of Systems   Constitutional: Negative for chills and fever.   HENT: Negative for congestion.    Respiratory: Negative for cough and shortness of breath.    Cardiovascular: Negative for chest pain.   Gastrointestinal: Positive for constipation. Negative for abdominal pain, nausea and vomiting.   Genitourinary: Negative for dysuria and urgency.   Musculoskeletal: Positive for joint pain.   Skin: Negative for itching.   Neurological: Negative for headaches.        Physical Exam  Temp:  [36 °C (96.8 °F)-37 °C (98.6 °F)] 36 °C (96.8 °F)  Pulse:  [61-88] 88  Resp:  [16-17] 16  BP: ()/(55-70) 126/62    Physical Exam   Constitutional: She appears well-developed and well-nourished.   HENT:   Head: Normocephalic.   Mouth/Throat: Oropharynx is clear and moist. No oropharyngeal exudate.   Eyes: Conjunctivae are normal. Right eye exhibits no discharge. Left eye exhibits no discharge.   Cardiovascular: Normal rate and regular rhythm.    Pulmonary/Chest: Effort normal. She  has no wheezes. She has no rales.   Abdominal: Soft. There is no rebound and no guarding.   RUQ tenderness   Musculoskeletal: She exhibits no edema.   Left hip with clean dressing in place   Lymphadenopathy:     She has no cervical adenopathy.   Neurological: She is alert.   Oriented to month and date, disoriented to year   Skin: Skin is warm and dry.   Nursing note and vitals reviewed.      Fluids    Intake/Output Summary (Last 24 hours) at 12/20/18 1201  Last data filed at 12/20/18 0927   Gross per 24 hour   Intake                0 ml   Output             1850 ml   Net            -1850 ml       Laboratory  Recent Labs      12/18/18   1115  12/19/18   0915  12/20/18   0436   WBC  12.9*  13.2*  14.4*   RBC  3.57*  3.26*  3.44*   HEMOGLOBIN  12.2  10.9*  11.8*   HEMATOCRIT  36.9*  33.7*  36.0*   MCV  103.4*  103.4*  104.7*   MCH  34.2*  33.4*  34.3*   MCHC  33.1*  32.3*  32.8*   RDW  56.8*  55.5*  57.0*   PLATELETCT  112*  113*  122*   MPV  11.2  11.3  12.0     Recent Labs      12/18/18   1115  12/19/18   0915  12/20/18   0436   SODIUM  135  134*  133*   POTASSIUM  3.6  3.4*  3.7   CHLORIDE  104  101  101   CO2  26  26  24   GLUCOSE  133*  174*  100*   BUN  15  10  11   CREATININE  1.26  0.99  0.95   CALCIUM  8.7  8.9  9.0                   Imaging  DX-CHEST-PORTABLE (1 VIEW)   Final Result      Mild cardiomegaly. No focal consolidation or pleural effusions.      DX-PELVIS-1 OR 2 VIEWS   Final Result      Satisfactory positioning, in AP view, of new left hip hemiarthroplasty.      DX-PELVIS-1 OR 2 VIEWS   Final Result      1.  LEFT femoral neck fracture with displacement and varus stimulation.   2.  No pelvic fracture demonstrated.      DX-FEMUR-1 VIEW LEFT   Final Result      Acute displaced fracture of the LEFT femoral neck with varus angulation.      DX-CHEST-LIMITED (1 VIEW)   Final Result      No acute cardiopulmonary disease.           Assessment/Plan  * Closed fracture of neck of left femur (HCC)- (present on  admission)   Assessment & Plan    S/p repair with Dr. Alcala  PTOT  Pain control     Essential hypertension- (present on admission)   Assessment & Plan    Controlled with current medication regimen.  Monitor.      Drug-induced constipation   Assessment & Plan    Will added scheduled tylenol to gabapentin and PRN tramadol to help with pain control and minimize narcotic use  Senakot BID and daily miralax     Leukocytosis   Assessment & Plan    Noted on admission  Leukocytosis persists.  CXR had mild cardiomegaly  Blood cultures collected  UA had some WBCs. Will culture urine and start on empiric antibiotics pending results     Urinary retention   Assessment & Plan    May be contributing to increase creatinine, improving  Mendiola cath placed  Started on flomax  Voiding trial in 5-7 days     Overweight (BMI 25.0-29.9)- (present on admission)   Assessment & Plan    Body mass index is 29.76 kg/m².       Cirrhosis (HCC)- (present on admission)   Assessment & Plan    History of  Without evidence of end-stage disease.  Unclear etiology.  Outpatient follow-up           VTE prophylaxis: lovenox

## 2018-12-21 ENCOUNTER — APPOINTMENT (OUTPATIENT)
Dept: RADIOLOGY | Facility: MEDICAL CENTER | Age: 73
DRG: 470 | End: 2018-12-21
Attending: INTERNAL MEDICINE
Payer: MEDICARE

## 2018-12-21 PROBLEM — I71.40 AAA (ABDOMINAL AORTIC ANEURYSM) (HCC): Status: ACTIVE | Noted: 2018-12-21

## 2018-12-21 LAB
ALBUMIN SERPL BCP-MCNC: 3.1 G/DL (ref 3.2–4.9)
ALBUMIN/GLOB SERPL: 1.1 G/DL
ALP SERPL-CCNC: 91 U/L (ref 30–99)
ALT SERPL-CCNC: 17 U/L (ref 2–50)
ANION GAP SERPL CALC-SCNC: 10 MMOL/L (ref 0–11.9)
AST SERPL-CCNC: 33 U/L (ref 12–45)
BACTERIA UR CULT: NORMAL
BASOPHILS # BLD AUTO: 0.3 % (ref 0–1.8)
BASOPHILS # BLD: 0.04 K/UL (ref 0–0.12)
BILIRUB SERPL-MCNC: 0.8 MG/DL (ref 0.1–1.5)
BUN SERPL-MCNC: 13 MG/DL (ref 8–22)
CALCIUM SERPL-MCNC: 9.4 MG/DL (ref 8.5–10.5)
CHLORIDE SERPL-SCNC: 100 MMOL/L (ref 96–112)
CO2 SERPL-SCNC: 23 MMOL/L (ref 20–33)
CREAT SERPL-MCNC: 0.91 MG/DL (ref 0.5–1.4)
EOSINOPHIL # BLD AUTO: 0.14 K/UL (ref 0–0.51)
EOSINOPHIL NFR BLD: 1 % (ref 0–6.9)
ERYTHROCYTE [DISTWIDTH] IN BLOOD BY AUTOMATED COUNT: 56 FL (ref 35.9–50)
GLOBULIN SER CALC-MCNC: 2.9 G/DL (ref 1.9–3.5)
GLUCOSE SERPL-MCNC: 144 MG/DL (ref 65–99)
HCT VFR BLD AUTO: 32.8 % (ref 37–47)
HGB BLD-MCNC: 11.2 G/DL (ref 12–16)
IMM GRANULOCYTES # BLD AUTO: 0.08 K/UL (ref 0–0.11)
IMM GRANULOCYTES NFR BLD AUTO: 0.5 % (ref 0–0.9)
LACTATE BLD-SCNC: 1.7 MMOL/L (ref 0.5–2)
LACTATE BLD-SCNC: 2.1 MMOL/L (ref 0.5–2)
LYMPHOCYTES # BLD AUTO: 1.3 K/UL (ref 1–4.8)
LYMPHOCYTES NFR BLD: 8.9 % (ref 22–41)
MCH RBC QN AUTO: 35 PG (ref 27–33)
MCHC RBC AUTO-ENTMCNC: 34.1 G/DL (ref 33.6–35)
MCV RBC AUTO: 102.5 FL (ref 81.4–97.8)
MONOCYTES # BLD AUTO: 1.07 K/UL (ref 0–0.85)
MONOCYTES NFR BLD AUTO: 7.3 % (ref 0–13.4)
NEUTROPHILS # BLD AUTO: 12.02 K/UL (ref 2–7.15)
NEUTROPHILS NFR BLD: 82 % (ref 44–72)
NRBC # BLD AUTO: 0 K/UL
NRBC BLD-RTO: 0 /100 WBC
PLATELET # BLD AUTO: 157 K/UL (ref 164–446)
PMV BLD AUTO: 11.5 FL (ref 9–12.9)
POTASSIUM SERPL-SCNC: 4.1 MMOL/L (ref 3.6–5.5)
PROT SERPL-MCNC: 6 G/DL (ref 6–8.2)
RBC # BLD AUTO: 3.2 M/UL (ref 4.2–5.4)
SIGNIFICANT IND 70042: NORMAL
SITE SITE: NORMAL
SODIUM SERPL-SCNC: 133 MMOL/L (ref 135–145)
SOURCE SOURCE: NORMAL
WBC # BLD AUTO: 14.7 K/UL (ref 4.8–10.8)

## 2018-12-21 PROCEDURE — 770006 HCHG ROOM/CARE - MED/SURG/GYN SEMI*

## 2018-12-21 PROCEDURE — A9270 NON-COVERED ITEM OR SERVICE: HCPCS | Performed by: HOSPITALIST

## 2018-12-21 PROCEDURE — 700102 HCHG RX REV CODE 250 W/ 637 OVERRIDE(OP): Performed by: PHYSICIAN ASSISTANT

## 2018-12-21 PROCEDURE — 700111 HCHG RX REV CODE 636 W/ 250 OVERRIDE (IP): Performed by: ORTHOPAEDIC SURGERY

## 2018-12-21 PROCEDURE — 83605 ASSAY OF LACTIC ACID: CPT

## 2018-12-21 PROCEDURE — 74177 CT ABD & PELVIS W/CONTRAST: CPT

## 2018-12-21 PROCEDURE — A9270 NON-COVERED ITEM OR SERVICE: HCPCS | Performed by: PHYSICIAN ASSISTANT

## 2018-12-21 PROCEDURE — 700117 HCHG RX CONTRAST REV CODE 255: Performed by: INTERNAL MEDICINE

## 2018-12-21 PROCEDURE — 80053 COMPREHEN METABOLIC PANEL: CPT

## 2018-12-21 PROCEDURE — 700102 HCHG RX REV CODE 250 W/ 637 OVERRIDE(OP): Performed by: HOSPITALIST

## 2018-12-21 PROCEDURE — 700111 HCHG RX REV CODE 636 W/ 250 OVERRIDE (IP): Performed by: INTERNAL MEDICINE

## 2018-12-21 PROCEDURE — 85025 COMPLETE CBC W/AUTO DIFF WBC: CPT

## 2018-12-21 PROCEDURE — A9270 NON-COVERED ITEM OR SERVICE: HCPCS | Performed by: INTERNAL MEDICINE

## 2018-12-21 PROCEDURE — 700102 HCHG RX REV CODE 250 W/ 637 OVERRIDE(OP): Performed by: INTERNAL MEDICINE

## 2018-12-21 PROCEDURE — 36415 COLL VENOUS BLD VENIPUNCTURE: CPT

## 2018-12-21 PROCEDURE — 700105 HCHG RX REV CODE 258: Performed by: INTERNAL MEDICINE

## 2018-12-21 PROCEDURE — 99233 SBSQ HOSP IP/OBS HIGH 50: CPT | Performed by: INTERNAL MEDICINE

## 2018-12-21 PROCEDURE — 76705 ECHO EXAM OF ABDOMEN: CPT

## 2018-12-21 PROCEDURE — 700101 HCHG RX REV CODE 250: Performed by: INTERNAL MEDICINE

## 2018-12-21 RX ORDER — SODIUM CHLORIDE, SODIUM LACTATE, POTASSIUM CHLORIDE, CALCIUM CHLORIDE 600; 310; 30; 20 MG/100ML; MG/100ML; MG/100ML; MG/100ML
INJECTION, SOLUTION INTRAVENOUS CONTINUOUS
Status: DISCONTINUED | OUTPATIENT
Start: 2018-12-21 | End: 2018-12-24

## 2018-12-21 RX ORDER — SODIUM CHLORIDE 9 MG/ML
INJECTION, SOLUTION INTRAVENOUS
Status: COMPLETED
Start: 2018-12-21 | End: 2018-12-21

## 2018-12-21 RX ORDER — HYDRALAZINE HYDROCHLORIDE 20 MG/ML
10 INJECTION INTRAMUSCULAR; INTRAVENOUS EVERY 6 HOURS PRN
Status: DISCONTINUED | OUTPATIENT
Start: 2018-12-21 | End: 2018-12-26 | Stop reason: HOSPADM

## 2018-12-21 RX ADMIN — LEVOTHYROXINE SODIUM 137 MCG: 137 TABLET ORAL at 04:52

## 2018-12-21 RX ADMIN — TRAMADOL HYDROCHLORIDE 50 MG: 50 TABLET, FILM COATED ORAL at 11:08

## 2018-12-21 RX ADMIN — MAGNESIUM HYDROXIDE 30 ML: 400 SUSPENSION ORAL at 15:24

## 2018-12-21 RX ADMIN — IOHEXOL 100 ML: 350 INJECTION, SOLUTION INTRAVENOUS at 08:53

## 2018-12-21 RX ADMIN — GABAPENTIN 200 MG: 100 CAPSULE ORAL at 04:52

## 2018-12-21 RX ADMIN — CEFTRIAXONE SODIUM 2 G: 2 INJECTION, POWDER, FOR SOLUTION INTRAMUSCULAR; INTRAVENOUS at 11:09

## 2018-12-21 RX ADMIN — SULFAMETHOXAZOLE AND TRIMETHOPRIM 1 TABLET: 800; 160 TABLET ORAL at 04:53

## 2018-12-21 RX ADMIN — ALLOPURINOL 300 MG: 300 TABLET ORAL at 04:53

## 2018-12-21 RX ADMIN — STANDARDIZED SENNA CONCENTRATE AND DOCUSATE SODIUM 2 TABLET: 8.6; 5 TABLET, FILM COATED ORAL at 16:35

## 2018-12-21 RX ADMIN — TIOTROPIUM BROMIDE 1 CAPSULE: 18 CAPSULE ORAL; RESPIRATORY (INHALATION) at 04:53

## 2018-12-21 RX ADMIN — VALSARTAN 160 MG: 80 TABLET, FILM COATED ORAL at 04:52

## 2018-12-21 RX ADMIN — SPIRONOLACTONE 100 MG: 25 TABLET, FILM COATED ORAL at 04:52

## 2018-12-21 RX ADMIN — GABAPENTIN 200 MG: 100 CAPSULE ORAL at 16:35

## 2018-12-21 RX ADMIN — METRONIDAZOLE 500 MG: 500 INJECTION, SOLUTION INTRAVENOUS at 15:24

## 2018-12-21 RX ADMIN — GABAPENTIN 200 MG: 100 CAPSULE ORAL at 11:09

## 2018-12-21 RX ADMIN — METRONIDAZOLE 500 MG: 500 INJECTION, SOLUTION INTRAVENOUS at 22:13

## 2018-12-21 RX ADMIN — POLYETHYLENE GLYCOL 3350 1 PACKET: 17 POWDER, FOR SOLUTION ORAL at 04:48

## 2018-12-21 RX ADMIN — BUPROPION HYDROCHLORIDE 75 MG: 75 TABLET, FILM COATED ORAL at 04:52

## 2018-12-21 RX ADMIN — SODIUM CHLORIDE, POTASSIUM CHLORIDE, SODIUM LACTATE AND CALCIUM CHLORIDE: 600; 310; 30; 20 INJECTION, SOLUTION INTRAVENOUS at 16:35

## 2018-12-21 RX ADMIN — TAMSULOSIN HYDROCHLORIDE 0.4 MG: 0.4 CAPSULE ORAL at 09:07

## 2018-12-21 RX ADMIN — ACETAMINOPHEN 650 MG: 325 TABLET, FILM COATED ORAL at 11:08

## 2018-12-21 RX ADMIN — STANDARDIZED SENNA CONCENTRATE AND DOCUSATE SODIUM 2 TABLET: 8.6; 5 TABLET, FILM COATED ORAL at 04:52

## 2018-12-21 RX ADMIN — ENOXAPARIN SODIUM 40 MG: 100 INJECTION SUBCUTANEOUS at 22:13

## 2018-12-21 ASSESSMENT — ENCOUNTER SYMPTOMS
VOMITING: 0
COUGH: 0
CONSTIPATION: 1
ABDOMINAL PAIN: 1
HEADACHES: 0
SHORTNESS OF BREATH: 0
NAUSEA: 0
FEVER: 0
CHILLS: 0

## 2018-12-21 ASSESSMENT — PAIN SCALES - GENERAL
PAINLEVEL_OUTOF10: ASSUMED PAIN PRESENT
PAINLEVEL_OUTOF10: 0
PAINLEVEL_OUTOF10: 0

## 2018-12-21 NOTE — DISCHARGE PLANNING
Anticipated Discharge Disposition: SNF    Action: Request from patient's niece and POA Jody [phone] she would like referral sent to [facility] and that would be her number one choice and patient is accepted.    Barriers to Discharge: medical clearance     Plan: SNF

## 2018-12-21 NOTE — PROGRESS NOTES
"   Orthopaedic PA Progress Note    Interval changes: Did well overnight, new cough, concern for Atx vs pneumonia    ROS - Patient denies any new issues. No chest pain, dyspnea, or fever.  Pain well controlled.    Blood pressure 134/74, pulse (!) 57, temperature 36.7 °C (98 °F), temperature source Temporal, resp. rate 16, height 1.6 m (5' 3\"), weight 74.7 kg (164 lb 10.9 oz), SpO2 93 %, not currently breastfeeding.    Patient seen and examined  No acute distress  Breathing non labored  RRR  Surgical dressing is clean, dry, and intact. Patient clearly fires tibialis anterior, EHL, and gastrocnemius/soleus. Sensation is intact to light touch throughout superficial peroneal, deep peroneal, tibial, saphenous, and sural nerve distributions. Strong and palpable 2+ dorsalis pedis and posterior tibial pulses with capillary refill less than 2 seconds. No lower leg tenderness or discomfort.    Recent Labs      12/18/18   1115  12/19/18   0915  12/20/18   0436   WBC  12.9*  13.2*  14.4*   RBC  3.57*  3.26*  3.44*   HEMOGLOBIN  12.2  10.9*  11.8*   HEMATOCRIT  36.9*  33.7*  36.0*   MCV  103.4*  103.4*  104.7*   MCH  34.2*  33.4*  34.3*   MCHC  33.1*  32.3*  32.8*   RDW  56.8*  55.5*  57.0*   PLATELETCT  112*  113*  122*   MPV  11.2  11.3  12.0       Active Hospital Problems    Diagnosis   • Essential hypertension [I10]     Priority: High     ICD-10 transition     • Drug-induced constipation [K59.03]   • Urinary retention [R33.9]   • Leukocytosis [D72.829]   • Overweight (BMI 25.0-29.9) [E66.3]   • Closed fracture of neck of left femur (HCC) [S72.002A]   • Cirrhosis (HCC) [K74.60]     Assessment/Plan:  Doing well  IS recommended and discussed  POD#4 S/P left hip hemiarthroplasty  Wt bearing status - WBAT  Wound care/Drains - dressing left in place  Future Procedures - none planned   Sutures/Staples out- 10-14 days post operatively  PT/OT-initiated  DVT Prophylaxis- TEDS/SCDs/Foot pumps/lovenox  Mendiola-none  Case Coordination for " Discharge Planning - Disposition SNF

## 2018-12-21 NOTE — CARE PLAN
Problem: Safety  Goal: Will remain free from falls    Intervention: Implement fall precautions  Calls appropriately. Call light and personal belongings within easy reach. Educated on level of risk. Fall precautions in place. Instructed to call for assistance whenever needed.      Problem: Mobility  Goal: Risk for activity intolerance will decrease    Intervention: Provide rest periods  Ambulated as tolerated. Clustered nursing interventions to promote rest.

## 2018-12-21 NOTE — PROGRESS NOTES
Pt made NPO per Dr. Bah conversation with Dr. Ramires (gen surg); Dr. Bah states she will call family to update

## 2018-12-21 NOTE — DISCHARGE PLANNING
Received Choice form at [phone]  Agency/Facility Name: [facility]  Referral sent per Choice form @ [phone]

## 2018-12-21 NOTE — PROGRESS NOTES
Hospital Medicine Daily Progress Note    Date of Service  12/21/2018    Chief Complaint  73 y.o. female admitted 12/15/2018 with fall.    Hospital Course    PMH HTN, HLD, cirrhosis who presented with mechanical fall and found with left femoral neck fracture. Dr. Alcala with ortho was consulted and patient had left hip hemiarthroplasty 12/16.        Interval Problem Update  Urine culture was negative, stopped bactrim.  Continues to have leukocytosis. CT showed inflammation of terminal ileum, appendix not seen. US did not visualized appendix. Started on ceftriaxone and flagyl. I consulted Dr. Ramires  Remove ayon cath for voiding trial.  She is much more oriented today    Consultants/Specialty  Ortho  GI    Code Status  Full    Disposition  SNF - [facility]  Sutures/Staples out- 10-14 days post operatively  WBAT  Ayon cath    Review of Systems  Review of Systems   Constitutional: Negative for chills and fever.   HENT: Negative for congestion.    Respiratory: Negative for cough and shortness of breath.    Cardiovascular: Negative for chest pain.   Gastrointestinal: Positive for abdominal pain and constipation. Negative for nausea and vomiting.   Genitourinary: Negative for dysuria and urgency.   Musculoskeletal: Positive for joint pain.   Skin: Negative for itching.   Neurological: Negative for headaches.        Physical Exam  Temp:  [36.1 °C (96.9 °F)-36.7 °C (98 °F)] 36.2 °C (97.1 °F)  Pulse:  [57-88] 88  Resp:  [16-17] 16  BP: ()/(54-74) 151/54    Physical Exam   Constitutional: She appears well-developed and well-nourished.   HENT:   Head: Normocephalic.   Mouth/Throat: Oropharynx is clear and moist. No oropharyngeal exudate.   Eyes: Conjunctivae are normal. Right eye exhibits no discharge. Left eye exhibits no discharge.   Cardiovascular: Normal rate and regular rhythm.    Pulmonary/Chest: Effort normal. She has no wheezes. She has no rales.   Abdominal: Soft. There is no rebound and no guarding.   RUQ  tenderness   Musculoskeletal: She exhibits no edema.   Left hip with clean dressing in place   Lymphadenopathy:     She has no cervical adenopathy.   Neurological: She is alert.   Oriented to month, date, year, and why she was hospitalized, able to say months of the year backwards   Skin: Skin is warm and dry.   Psychiatric: She is attentive.   Nursing note and vitals reviewed.      Fluids    Intake/Output Summary (Last 24 hours) at 12/21/18 1249  Last data filed at 12/21/18 0600   Gross per 24 hour   Intake                0 ml   Output              900 ml   Net             -900 ml       Laboratory  Recent Labs      12/19/18   0915  12/20/18   0436  12/21/18   0414   WBC  13.2*  14.4*  14.7*   RBC  3.26*  3.44*  3.20*   HEMOGLOBIN  10.9*  11.8*  11.2*   HEMATOCRIT  33.7*  36.0*  32.8*   MCV  103.4*  104.7*  102.5*   MCH  33.4*  34.3*  35.0*   MCHC  32.3*  32.8*  34.1   RDW  55.5*  57.0*  56.0*   PLATELETCT  113*  122*  157*   MPV  11.3  12.0  11.5     Recent Labs      12/19/18   0915  12/20/18   0436  12/21/18   0414   SODIUM  134*  133*  133*   POTASSIUM  3.4*  3.7  4.1   CHLORIDE  101  101  100   CO2  26  24  23   GLUCOSE  174*  100*  144*   BUN  10  11  13   CREATININE  0.99  0.95  0.91   CALCIUM  8.9  9.0  9.4                   Imaging  US-APPENDIX   Final Result      The appendix is not delineated.      CT-ABDOMEN-PELVIS WITH   Final Result      1.  Thickening of the terminal ileum related to infectious or inflammatory enteritis. Crohn disease is a consideration. This would be an unusual appearance for ischemic enteritis.   2.  Trace RIGHT pleural effusion   3.  Probable tiny BILATERAL renal cysts   4.  Atherosclerosis   5.  Interval enlargement of abdominal aortic aneurysm measuring 4.2 x 4.7 cm   6.  Appendix not visualized   7.  Prior posterior decompression and instrumented fusion spanning T12-S1               DX-CHEST-PORTABLE (1 VIEW)   Final Result      Mild cardiomegaly. No focal consolidation or  pleural effusions.      DX-PELVIS-1 OR 2 VIEWS   Final Result      Satisfactory positioning, in AP view, of new left hip hemiarthroplasty.      DX-PELVIS-1 OR 2 VIEWS   Final Result      1.  LEFT femoral neck fracture with displacement and varus stimulation.   2.  No pelvic fracture demonstrated.      DX-FEMUR-1 VIEW LEFT   Final Result      Acute displaced fracture of the LEFT femoral neck with varus angulation.      DX-CHEST-LIMITED (1 VIEW)   Final Result      No acute cardiopulmonary disease.           Assessment/Plan  * Closed fracture of neck of left femur (HCC)- (present on admission)   Assessment & Plan    S/p repair with Dr. Alcala  PTOT  Pain control     Essential hypertension- (present on admission)   Assessment & Plan    Controlled with current medication regimen.  Monitor.      AAA (abdominal aortic aneurysm) ([facility])   Assessment & Plan    Infrarenal abdominal aortic aneurysm measures up to 4.2 x 4.7 cm, previously 3.8 x 2.8 cm.  F/u imaging as outpatient for monitoring  BP control     Drug-induced constipation   Assessment & Plan    Will added scheduled tylenol to gabapentin and PRN tramadol to help with pain control and minimize narcotic use  Senakot BID and daily miralax     Leukocytosis   Assessment & Plan    Noted on admission  Leukocytosis persists.  CXR had mild cardiomegaly  Blood cultures collected  Urine culture was negative.  She has RLQ tenderness. CT showed inflammation of terminal ileum, appendix not seen. US did not visualized appendix. Started on ceftriaxone and flagyl. I consulted Dr. Ramires     Urinary retention   Assessment & Plan    May be contributing to increase creatinine, improved  Started on flomax  Remove aoyn cath and Voiding trial today     Overweight (BMI 25.0-29.9)- (present on admission)   Assessment & Plan    Body mass index is 29.76 kg/m².       Cirrhosis (HCC)- (present on admission)   Assessment & Plan    History of  Without evidence of end-stage disease.  Unclear  etiology.  Outpatient follow-up           VTE prophylaxis: lovenox

## 2018-12-21 NOTE — ASSESSMENT & PLAN NOTE
Infrarenal abdominal aortic aneurysm measures up to 4.2 x 4.7 cm, previously 3.8 x 2.8 cm.  F/u imaging as outpatient for monitoring  BP control

## 2018-12-21 NOTE — CONSULTS
DATE OF SERVICE:  12/21/2018    HISTORY OF PRESENT ILLNESS:  As follows, the patient is a 73-year-old female   who was admitted on 12/15/2018 after she fell and fractured her left hip.  She   has had this fixed by Dr. Alcala.  Today, she has been complaining of some   vague pain.  Dr. Bah obtained a CT scan of her abdomen and was found to have   evidence of a thickened terminal ileum approximately 3-4 cm in length.  No   evidence of bowel obstruction, no evidence of free air, no evidence of free   fluid.  The appendix was not seen.  The patient currently denies any nausea,   vomiting, problems with having bowel movements.  She denies any pain and less   palpation over her right lower quadrant is performed and is mild.    ALLERGIES:  SHE IS ALLERGIC TO AMOXICILLIN, ____ AND STATINS.    PAST MEDICAL HISTORY:  Acute respiratory failure, arthritis, depression, gout,   hyperlipidemia, hypothyroidism.  She is overweight, history of peripheral   vascular disease, history of breast cancer, history of previous studies of   cirrhosis.    PAST SURGICAL HISTORY:  Includes lumbar fusion, laminectomy, carotid   endarterectomy and recent left hip surgery.    FAMILY HISTORY:  States she has heart disease in her father and mother.    SOCIAL HISTORY:  Continues to still smoke about 1/4 pack of cigarettes a day.    She drinks alcohol.  No drug use.    MEDICATIONS ON ADMISSION:  She takes vitamin D, coenzyme Q10, calcium   supplements, B12, Excedrin as needed for sleep, ibuprofen, multivitamins,   potassium gluconate, allopurinol, Wellbutrin 75 mg p.o. daily, colchicine 0.6   mg p.o. daily, Lasix 40 mg p.o. daily, gabapentin 300 mg t.i.d., lactulose 20   g p.o. t.i.d., Synthroid 0.137 mg p.o. daily, spironolactone 100 mg p.o.   daily, Spiriva 18 mcg caps p.o. daily, tramadol as needed for pain, and Diovan   325 mg p.o. daily.    PHYSICAL EXAMINATION:  GENERAL:  Patient is a well-nourished adult female, resting comfortably in her   bed.   She currently denies any pain except for when deep palpation is   performed.  VITAL SIGNS:  Today are temperature 36.2, heart rate 88, respirations 16, and   blood pressure 151/54.  HEAD AND NECK:  Pupils equal, round and react to light.  Extraocular movements   are intact.  No scleral icterus.  No cervical adenopathy.  LUNGS:  Clear bilaterally without wheezes, rales or rhonchi.  Normal chest   wall expansion.  HEART:  Regular rate and rhythm without murmurs, rubs or gallops.  No carotid   bruits.  No jugular venous distention.  No peripheral edema.  ABDOMEN:  Soft, mildly overweight.  She has positive bowel sounds.  There is   very minor tenderness to deep palpation in the right lower quadrant without   true rebound, guarding, or peritoneal signs.  She has no generalized abdominal   pain.  NEUROLOGICAL:  Cranial nerves II-XII are grossly intact.  Sensation grossly   normal.  PSYCHIATRIC:  Alert and oriented x3.  Mood and affect are appropriate.  MUSCULOSKELETAL:  Moves all 4 extremities.    LABORATORY DATA:  Laboratory values show a white count of 14.7, hemoglobin and   hematocrit of 11.2 and 32.8 with 157,000 platelets with a left shift, but no   bands.  Sodium 133, potassium 4.1, chloride 100, bicarbonate 23, glucose 144,   BUN of 13, creatinine 0.9, AST of 33, ALT of 17, alkaline phosphatase 91,   total bilirubin 0.8.  CT scan shows a small segment of terminal ileum that is   edematous with thickened wall, but no free fluid or fat stranding other than   around just the TI.  There is no evidence of a bowel obstruction.    IMPRESSION:  1.  Terminal ileitis of unknown etiology.  Plan, the patient will be started   on IV antibiotics, kept n.p.o. and monitored with repeat white count and   serial exams.  She potentially will need either a small bowel follow through   and/or endoscopy to try to determine the etiology of this if she does not   respond versus diagnostic laparoscopy.  2.  Hypertension.  2.  Recent  fracture of left hip.  4.  Hypothyroidism.    PLAN:  As stated, the patient will be treated conservatively.  If she   deteriorates or has increasing white count, or generalized abdominal pain, she   will need emergent surgery.  She is in agreement with this plan.  I have   discussed it with Dr. Bah.       ____________________________________     MD MARU Oneal / NTS    DD:  12/21/2018 13:51:55  DT:  12/21/2018 14:41:22    D#:  [id number]  Job#:  [id number]

## 2018-12-21 NOTE — PROGRESS NOTES
Pt transported downstairs for CT of abdomen. Called to update pt's niece, Jody. Discussed need for CT including leukocytosis, and abdomen pain. Jody had no further questions.

## 2018-12-22 PROBLEM — K52.9 ILEITIS: Status: ACTIVE | Noted: 2018-12-18

## 2018-12-22 LAB
ANION GAP SERPL CALC-SCNC: 5 MMOL/L (ref 0–11.9)
BASOPHILS # BLD AUTO: 0.6 % (ref 0–1.8)
BASOPHILS # BLD: 0.06 K/UL (ref 0–0.12)
BUN SERPL-MCNC: 12 MG/DL (ref 8–22)
CALCIUM SERPL-MCNC: 9.1 MG/DL (ref 8.5–10.5)
CHLORIDE SERPL-SCNC: 101 MMOL/L (ref 96–112)
CO2 SERPL-SCNC: 28 MMOL/L (ref 20–33)
CREAT SERPL-MCNC: 0.91 MG/DL (ref 0.5–1.4)
EOSINOPHIL # BLD AUTO: 0.14 K/UL (ref 0–0.51)
EOSINOPHIL NFR BLD: 1.3 % (ref 0–6.9)
ERYTHROCYTE [DISTWIDTH] IN BLOOD BY AUTOMATED COUNT: 56.4 FL (ref 35.9–50)
GLUCOSE SERPL-MCNC: 94 MG/DL (ref 65–99)
HCT VFR BLD AUTO: 31.5 % (ref 37–47)
HGB BLD-MCNC: 10.2 G/DL (ref 12–16)
IMM GRANULOCYTES # BLD AUTO: 0.04 K/UL (ref 0–0.11)
IMM GRANULOCYTES NFR BLD AUTO: 0.4 % (ref 0–0.9)
LYMPHOCYTES # BLD AUTO: 1.13 K/UL (ref 1–4.8)
LYMPHOCYTES NFR BLD: 10.5 % (ref 22–41)
MCH RBC QN AUTO: 33.4 PG (ref 27–33)
MCHC RBC AUTO-ENTMCNC: 32.4 G/DL (ref 33.6–35)
MCV RBC AUTO: 103.3 FL (ref 81.4–97.8)
MONOCYTES # BLD AUTO: 0.91 K/UL (ref 0–0.85)
MONOCYTES NFR BLD AUTO: 8.5 % (ref 0–13.4)
NEUTROPHILS # BLD AUTO: 8.46 K/UL (ref 2–7.15)
NEUTROPHILS NFR BLD: 78.7 % (ref 44–72)
NRBC # BLD AUTO: 0 K/UL
NRBC BLD-RTO: 0 /100 WBC
PLATELET # BLD AUTO: 144 K/UL (ref 164–446)
PMV BLD AUTO: 11.3 FL (ref 9–12.9)
POTASSIUM SERPL-SCNC: 4.1 MMOL/L (ref 3.6–5.5)
RBC # BLD AUTO: 3.05 M/UL (ref 4.2–5.4)
SODIUM SERPL-SCNC: 134 MMOL/L (ref 135–145)
WBC # BLD AUTO: 10.7 K/UL (ref 4.8–10.8)

## 2018-12-22 PROCEDURE — 99232 SBSQ HOSP IP/OBS MODERATE 35: CPT | Performed by: INTERNAL MEDICINE

## 2018-12-22 PROCEDURE — 700102 HCHG RX REV CODE 250 W/ 637 OVERRIDE(OP): Performed by: INTERNAL MEDICINE

## 2018-12-22 PROCEDURE — 80048 BASIC METABOLIC PNL TOTAL CA: CPT

## 2018-12-22 PROCEDURE — 700102 HCHG RX REV CODE 250 W/ 637 OVERRIDE(OP): Performed by: HOSPITALIST

## 2018-12-22 PROCEDURE — 770006 HCHG ROOM/CARE - MED/SURG/GYN SEMI*

## 2018-12-22 PROCEDURE — 700102 HCHG RX REV CODE 250 W/ 637 OVERRIDE(OP): Performed by: PHYSICIAN ASSISTANT

## 2018-12-22 PROCEDURE — 36415 COLL VENOUS BLD VENIPUNCTURE: CPT

## 2018-12-22 PROCEDURE — 700111 HCHG RX REV CODE 636 W/ 250 OVERRIDE (IP): Performed by: ORTHOPAEDIC SURGERY

## 2018-12-22 PROCEDURE — 85025 COMPLETE CBC W/AUTO DIFF WBC: CPT

## 2018-12-22 PROCEDURE — A9270 NON-COVERED ITEM OR SERVICE: HCPCS | Performed by: HOSPITALIST

## 2018-12-22 PROCEDURE — A9270 NON-COVERED ITEM OR SERVICE: HCPCS | Performed by: INTERNAL MEDICINE

## 2018-12-22 PROCEDURE — 700105 HCHG RX REV CODE 258: Performed by: INTERNAL MEDICINE

## 2018-12-22 PROCEDURE — 700111 HCHG RX REV CODE 636 W/ 250 OVERRIDE (IP): Performed by: INTERNAL MEDICINE

## 2018-12-22 PROCEDURE — A9270 NON-COVERED ITEM OR SERVICE: HCPCS | Performed by: PHYSICIAN ASSISTANT

## 2018-12-22 PROCEDURE — A9270 NON-COVERED ITEM OR SERVICE: HCPCS | Performed by: NURSE PRACTITIONER

## 2018-12-22 PROCEDURE — 700102 HCHG RX REV CODE 250 W/ 637 OVERRIDE(OP): Performed by: NURSE PRACTITIONER

## 2018-12-22 PROCEDURE — 700101 HCHG RX REV CODE 250: Performed by: INTERNAL MEDICINE

## 2018-12-22 RX ADMIN — OXYCODONE HYDROCHLORIDE 2.5 MG: 5 TABLET ORAL at 10:57

## 2018-12-22 RX ADMIN — SPIRONOLACTONE 100 MG: 25 TABLET, FILM COATED ORAL at 05:09

## 2018-12-22 RX ADMIN — VALSARTAN 160 MG: 80 TABLET, FILM COATED ORAL at 05:09

## 2018-12-22 RX ADMIN — OXYCODONE HYDROCHLORIDE 2.5 MG: 5 TABLET ORAL at 20:16

## 2018-12-22 RX ADMIN — TRAMADOL HYDROCHLORIDE 50 MG: 50 TABLET, FILM COATED ORAL at 17:08

## 2018-12-22 RX ADMIN — LACTULOSE 30 ML: 20 SOLUTION ORAL at 17:06

## 2018-12-22 RX ADMIN — TRAMADOL HYDROCHLORIDE 100 MG: 50 TABLET, FILM COATED ORAL at 00:07

## 2018-12-22 RX ADMIN — GABAPENTIN 200 MG: 100 CAPSULE ORAL at 10:57

## 2018-12-22 RX ADMIN — CEFTRIAXONE SODIUM 2 G: 2 INJECTION, POWDER, FOR SOLUTION INTRAMUSCULAR; INTRAVENOUS at 08:53

## 2018-12-22 RX ADMIN — ENOXAPARIN SODIUM 40 MG: 100 INJECTION SUBCUTANEOUS at 20:16

## 2018-12-22 RX ADMIN — METRONIDAZOLE 500 MG: 500 INJECTION, SOLUTION INTRAVENOUS at 10:58

## 2018-12-22 RX ADMIN — TAMSULOSIN HYDROCHLORIDE 0.4 MG: 0.4 CAPSULE ORAL at 08:57

## 2018-12-22 RX ADMIN — LEVOTHYROXINE SODIUM 137 MCG: 137 TABLET ORAL at 05:10

## 2018-12-22 RX ADMIN — BUPROPION HYDROCHLORIDE 75 MG: 75 TABLET, FILM COATED ORAL at 05:10

## 2018-12-22 RX ADMIN — GABAPENTIN 200 MG: 100 CAPSULE ORAL at 05:10

## 2018-12-22 RX ADMIN — ALLOPURINOL 300 MG: 300 TABLET ORAL at 05:10

## 2018-12-22 RX ADMIN — GABAPENTIN 200 MG: 100 CAPSULE ORAL at 17:06

## 2018-12-22 RX ADMIN — SODIUM CHLORIDE, POTASSIUM CHLORIDE, SODIUM LACTATE AND CALCIUM CHLORIDE: 600; 310; 30; 20 INJECTION, SOLUTION INTRAVENOUS at 05:15

## 2018-12-22 RX ADMIN — METRONIDAZOLE 500 MG: 500 INJECTION, SOLUTION INTRAVENOUS at 14:59

## 2018-12-22 RX ADMIN — TIOTROPIUM BROMIDE 1 CAPSULE: 18 CAPSULE ORAL; RESPIRATORY (INHALATION) at 05:08

## 2018-12-22 RX ADMIN — METRONIDAZOLE 500 MG: 500 INJECTION, SOLUTION INTRAVENOUS at 21:56

## 2018-12-22 ASSESSMENT — ENCOUNTER SYMPTOMS
FEVER: 0
HEADACHES: 0
NAUSEA: 0
RESPIRATORY NEGATIVE: 1
SHORTNESS OF BREATH: 0
DIARRHEA: 0
CARDIOVASCULAR NEGATIVE: 1
CHILLS: 0
CONSTIPATION: 0
ABDOMINAL PAIN: 0
COUGH: 0
VOMITING: 0

## 2018-12-22 ASSESSMENT — PAIN SCALES - GENERAL
PAINLEVEL_OUTOF10: 5
PAINLEVEL_OUTOF10: 3

## 2018-12-22 NOTE — PROGRESS NOTES
"S:  Seen and examined. s/p L hip sheldon.  Doing well this morning.  No new complaints, pain controlled.  Evaluated by Dr. Ramires, concern for terminal ileitis.    O: Blood pressure 143/61, pulse 88, temperature 36.5 °C (97.7 °F), temperature source Temporal, resp. rate 17, height 1.6 m (5' 3\"), weight 74.7 kg (164 lb 10.9 oz), SpO2 96 %, not currently breastfeeding..    Intake/Output Summary (Last 24 hours) at 12/21/18 1653  Last data filed at 12/21/18 0600   Gross per 24 hour   Intake                0 ml   Output              900 ml   Net             -900 ml   .    Operative/injured extremity examined.  Compartments soft, distal light touch sensation intact, firing EHL/TA/GS/P.  Toes warm, well-perfused.  Wound c/d/i    Recent Labs      12/19/18   0915  12/20/18   0436  12/21/18   0414   WBC  13.2*  14.4*  14.7*   RBC  3.26*  3.44*  3.20*   HEMOGLOBIN  10.9*  11.8*  11.2*   HEMATOCRIT  33.7*  36.0*  32.8*   MCV  103.4*  104.7*  102.5*   MCH  33.4*  34.3*  35.0*   MCHC  32.3*  32.8*  34.1   RDW  55.5*  57.0*  56.0*   PLATELETCT  113*  122*  157*   MPV  11.3  12.0  11.5       A/P:    POD #5 s/p L hip sheldon    Antibiotics: Per general surgery and medicine  Activity: WBAT, posterior precautions operative extremity.  PT today.  Diet: General  DVT: Mechanical (SCDs) + Pharmacologic (Lovenox, or per medicine)  Dispo: D/C planning    "

## 2018-12-22 NOTE — CARE PLAN
Problem: Safety  Goal: Will remain free from falls    Intervention: Assess risk factors for falls  Pt educated to call for assistance with ambulating. Personal items in reach       Problem: Knowledge Deficit  Goal: Knowledge of disease process/condition, treatment plan, diagnostic tests, and medications will improve    Intervention: Assess knowledge level of disease process/condition, treatment plan, diagnostic tests, and medications  Discussed with pt's niece about updates and plan of care.

## 2018-12-22 NOTE — PROGRESS NOTES
"   Orthopaedic PA Progress Note    Interval changes Feels much better.    ROS - Patient denies any new issues. No chest pain, dyspnea, or fever.  Pain well controlled.    Blood pressure 126/55, pulse (!) 59, temperature 35.9 °C (96.7 °F), temperature source Temporal, resp. rate 16, height 1.6 m (5' 3\"), weight 74.7 kg (164 lb 10.9 oz), SpO2 93 %, not currently breastfeeding.    Patient seen and examined  No acute distress  Breathing non labored  RRR  Surgical dressing is clean, dry, and intact. Patient clearly fires tibialis anterior, EHL, and gastrocnemius/soleus. Sensation is intact to light touch throughout superficial peroneal, deep peroneal, tibial, saphenous, and sural nerve distributions. Strong and palpable 2+ dorsalis pedis and posterior tibial pulses with capillary refill less than 2 seconds. No lower leg tenderness or discomfort.    Recent Labs      12/20/18   0436  12/21/18   0414  12/22/18   0417   WBC  14.4*  14.7*  10.7   RBC  3.44*  3.20*  3.05*   HEMOGLOBIN  11.8*  11.2*  10.2*   HEMATOCRIT  36.0*  32.8*  31.5*   MCV  104.7*  102.5*  103.3*   MCH  34.3*  35.0*  33.4*   MCHC  32.8*  34.1  32.4*   RDW  57.0*  56.0*  56.4*   PLATELETCT  122*  157*  144*   MPV  12.0  11.5  11.3     Active Hospital Problems    Diagnosis   • Essential hypertension [I10]     Priority: High     ICD-10 transition     • AAA (abdominal aortic aneurysm) (HCC) [I71.4]   • Drug-induced constipation [K59.03]   • Urinary retention [R33.9]   • Leukocytosis [D72.829]   • Overweight (BMI 25.0-29.9) [E66.3]   • Closed fracture of neck of left femur (HCC) [S72.002A]   • Cirrhosis (HCC) [K74.60]     Assessment/Plan:  POD#6 S/P L hip sheldon  Wt bearing status - AT with PHP  PT/OT-initiated  Wound care:dressing left in place  Drains - no  Mendiola-no  Sutures/Staples out- 10-14 days post operatively  Antibiotics:Rocewphin  DVT Prophylaxis- TEDS/SCDs/Foot pumps.   Lovenox:40 mg daily, Duration-until ambulatory > 150'  Future Procedures - pending " Julienne re-eval  Case Coordination for Discharge Planning - Disposition Ready for transfer SNF when cleared by [facility]/Med

## 2018-12-22 NOTE — CARE PLAN
Problem: Safety  Goal: Will remain free from injury    Intervention: Provide assistance with mobility  Ambulated pt to bathroom with walker and CNA assistance. No incidents, pt tolerated well and voided.       Problem: Pain Management  Goal: Pain level will decrease to patient's comfort goal    Intervention: Follow pain managment plan developed in collaboration with patient and Interdisciplinary Team  Assessed pt pain level to ensure proper pain control. Pt does not require pain medication as this time

## 2018-12-22 NOTE — PROGRESS NOTES
[facility] Medicine Daily Progress Note    Date of Service  12/22/2018    Chief Complaint  73 y.o. female admitted 12/15/2018 with fall.    Hospital Course    PMH HTN, HLD, cirrhosis who presented with mechanical fall and found with left femoral neck fracture. Dr. Alcala with ortho was consulted and patient had left hip hemiarthroplasty 12/16.        Interval Problem Update  She had mildly elevated lactate 2.1 that decreased with IVF  Leukocytosis has normalized  Improved RLQ tenderness on exam  Will continue on course of ceftriaxone and flagyl for ileitis   Surgery following, continue NPO and possible SBFT  Much more awake and alert today    Consultants/Specialty  Ortho  Surgery    Code Status  Full    Disposition  SNF - [facility]  Sutures/Staples out- 10-14 days post operatively  WBAT    Review of Systems  Review of Systems   Constitutional: Negative for chills and fever.   HENT: Negative for congestion.    Respiratory: Negative for cough and shortness of breath.    Cardiovascular: Negative for chest pain.   Gastrointestinal: Negative for abdominal pain, constipation, diarrhea, nausea and vomiting.   Genitourinary: Negative for dysuria and urgency.   Musculoskeletal: Positive for joint pain.   Skin: Negative for itching.   Neurological: Negative for headaches.        Physical Exam  Temp:  [35.9 °C (96.7 °F)-36.5 °C (97.7 °F)] 35.9 °C (96.7 °F)  Pulse:  [59-88] 59  Resp:  [16-17] 16  BP: (111-145)/(46-61) 126/55    Physical Exam   Constitutional: She appears well-developed and well-nourished.   HENT:   Head: Normocephalic.   Mouth/Throat: Oropharynx is clear and moist. No oropharyngeal exudate.   Eyes: Conjunctivae are normal. Right eye exhibits no discharge. Left eye exhibits no discharge.   Cardiovascular: Normal rate and regular rhythm.    Pulmonary/Chest: Effort normal. She has no wheezes. She has no rales.   Abdominal: Soft. There is no rebound and no guarding.   Mild RUQ tenderness   Musculoskeletal: She  exhibits no edema.   Left hip with clean dressing in place, tender   Lymphadenopathy:     She has no cervical adenopathy.   Neurological: She is alert.   Oriented to month, date, year, and why she was hospitalized, able to say months of the year backwards   Skin: Skin is warm and dry.   Psychiatric: She is attentive.   Nursing note and vitals reviewed.      Fluids    Intake/Output Summary (Last 24 hours) at 12/22/18 1045  Last data filed at 12/21/18 1700   Gross per 24 hour   Intake                0 ml   Output             1100 ml   Net            -1100 ml       Laboratory  Recent Labs      12/20/18   0436  12/21/18   0414  12/22/18 0417   WBC  14.4*  14.7*  10.7   RBC  3.44*  3.20*  3.05*   HEMOGLOBIN  11.8*  11.2*  10.2*   HEMATOCRIT  36.0*  32.8*  31.5*   MCV  104.7*  102.5*  103.3*   MCH  34.3*  35.0*  33.4*   MCHC  32.8*  34.1  32.4*   RDW  57.0*  56.0*  56.4*   PLATELETCT  122*  157*  144*   MPV  12.0  11.5  11.3     Recent Labs      12/20/18   0436  12/21/18   0414  12/22/18 0417   SODIUM  133*  133*  134*   POTASSIUM  3.7  4.1  4.1   CHLORIDE  101  100  101   CO2  24  23  28   GLUCOSE  100*  144*  94   BUN  11  13  12   CREATININE  0.95  0.91  0.91   CALCIUM  9.0  9.4  9.1                   Imaging  US-APPENDIX   Final Result      The appendix is not delineated.      CT-ABDOMEN-PELVIS WITH   Final Result      1.  Thickening of the terminal ileum related to infectious or inflammatory enteritis. Crohn disease is a consideration. This would be an unusual appearance for ischemic enteritis.   2.  Trace RIGHT pleural effusion   3.  Probable tiny BILATERAL renal cysts   4.  Atherosclerosis   5.  Interval enlargement of abdominal aortic aneurysm measuring 4.2 x 4.7 cm   6.  Appendix not visualized   7.  Prior posterior decompression and instrumented fusion spanning T12-S1               DX-CHEST-PORTABLE (1 VIEW)   Final Result      Mild cardiomegaly. No focal consolidation or pleural effusions.      DX-PELVIS-1  OR 2 VIEWS   Final Result      Satisfactory positioning, in AP view, of new left hip hemiarthroplasty.      DX-PELVIS-1 OR 2 VIEWS   Final Result      1.  LEFT femoral neck fracture with displacement and varus stimulation.   2.  No pelvic fracture demonstrated.      DX-FEMUR-1 VIEW LEFT   Final Result      Acute displaced fracture of the LEFT femoral neck with varus angulation.      DX-CHEST-LIMITED (1 VIEW)   Final Result      No acute cardiopulmonary disease.           Assessment/Plan  * Closed fracture of neck of left femur (HCC)- (present on admission)   Assessment & Plan    S/p repair with Dr. Alcala  PTOT  Pain control     Essential hypertension- (present on admission)   Assessment & Plan    Controlled with current medication regimen.  Monitor.      AAA (abdominal aortic aneurysm) ([facility])   Assessment & Plan    Infrarenal abdominal aortic aneurysm measures up to 4.2 x 4.7 cm, previously 3.8 x 2.8 cm.  F/u imaging as outpatient for monitoring  BP control     Drug-induced constipation   Assessment & Plan    Will added scheduled tylenol to gabapentin and PRN tramadol to help with pain control and minimize narcotic use  Senakot BID and daily miralax     Ileitis   Assessment & Plan    Leukocytosis noted on admission  CXR had mild cardiomegaly. Blood cultures are negative. Urine culture was negative.  She has RLQ tenderness. CT showed inflammation of terminal ileum, appendix not seen. US did not visualized appendix. Started on ceftriaxone and flagyl and will continue  Surgery consulted, remain NPO, possible SBFT     Urinary retention   Assessment & Plan    May be contributing to increase creatinine, improved  Started on flomax  Mendiola cath removed 12/21 and monitor with bladder scans     Overweight (BMI 25.0-29.9)- (present on admission)   Assessment & Plan    Body mass index is 29.76 kg/m².       Cirrhosis (HCC)- (present on admission)   Assessment & Plan    History of  Without evidence of end-stage disease.  Unclear  etiology.  Outpatient follow-up           VTE prophylaxis: lovenox

## 2018-12-22 NOTE — PROGRESS NOTES
Surgical Progress Note    Author: Jun Ramires Date & Time created: 2018   6:47 AM     Interval Events:    Review of Systems   Respiratory: Negative.    Cardiovascular: Negative.    Gastrointestinal: Negative for nausea and vomiting.        Pain only when pressed on     Hemodynamics:  Temp (24hrs), Av.3 °C (97.4 °F), Min:36.2 °C (97.1 °F), Max:36.5 °C (97.7 °F)  Temperature: 36.2 °C (97.1 °F)  Pulse  Av.7  Min: 57  Max: 88   Blood Pressure : 111/46     Respiratory:    Respiration: 16, Pulse Oximetry: 95 %        RUL Breath Sounds: Clear, RML Breath Sounds: Diminished, RLL Breath Sounds: Diminished, JANETTE Breath Sounds: Clear, LLL Breath Sounds: Diminished  Neuro:  GCS       Fluids:    Intake/Output Summary (Last 24 hours) at 18 0647  Last data filed at 18 1700   Gross per 24 hour   Intake                0 ml   Output             1100 ml   Net            -1100 ml        Current Diet Order   Procedures   • Diet NPO     Physical Exam   Abdominal: Soft. Bowel sounds are normal. She exhibits no distension.   Localized tenderness on exam in RLQ     Labs:  Recent Results (from the past 24 hour(s))   LACTIC ACID    Collection Time: 18  1:39 PM   Result Value Ref Range    Lactic Acid 2.1 (H) 0.5 - 2.0 mmol/L   LACTIC ACID    Collection Time: 18  5:16 PM   Result Value Ref Range    Lactic Acid 1.7 0.5 - 2.0 mmol/L   CBC WITH DIFFERENTIAL    Collection Time: 18  4:17 AM   Result Value Ref Range    WBC 10.7 4.8 - 10.8 K/uL    RBC 3.05 (L) 4.20 - 5.40 M/uL    Hemoglobin 10.2 (L) 12.0 - 16.0 g/dL    Hematocrit 31.5 (L) 37.0 - 47.0 %    .3 (H) 81.4 - 97.8 fL    MCH 33.4 (H) 27.0 - 33.0 pg    MCHC 32.4 (L) 33.6 - 35.0 g/dL    RDW 56.4 (H) 35.9 - 50.0 fL    Platelet Count 144 (L) 164 - 446 K/uL    MPV 11.3 9.0 - 12.9 fL    Neutrophils-Polys 78.70 (H) 44.00 - 72.00 %    Lymphocytes 10.50 (L) 22.00 - 41.00 %    Monocytes 8.50 0.00 - 13.40 %    Eosinophils 1.30 0.00 - 6.90 %     Basophils 0.60 0.00 - 1.80 %    Immature Granulocytes 0.40 0.00 - 0.90 %    Nucleated RBC 0.00 /100 WBC    Neutrophils (Absolute) 8.46 (H) 2.00 - 7.15 K/uL    Lymphs (Absolute) 1.13 1.00 - 4.80 K/uL    Monos (Absolute) 0.91 (H) 0.00 - 0.85 K/uL    Eos (Absolute) 0.14 0.00 - 0.51 K/uL    Baso (Absolute) 0.06 0.00 - 0.12 K/uL    Immature Granulocytes (abs) 0.04 0.00 - 0.11 K/uL    NRBC (Absolute) 0.00 K/uL   BASIC METABOLIC PANEL    Collection Time: 12/22/18  4:17 AM   Result Value Ref Range    Sodium 134 (L) 135 - 145 mmol/L    Potassium 4.1 3.6 - 5.5 mmol/L    Chloride 101 96 - 112 mmol/L    Co2 28 20 - 33 mmol/L    Glucose 94 65 - 99 mg/dL    Bun 12 8 - 22 mg/dL    Creatinine 0.91 0.50 - 1.40 mg/dL    Calcium 9.1 8.5 - 10.5 mg/dL    Anion Gap 5.0 0.0 - 11.9   ESTIMATED GFR    Collection Time: 12/22/18  4:17 AM   Result Value Ref Range    GFR If African American >60 >60 mL/min/1.73 m 2    GFR If Non African American >60 >60 mL/min/1.73 m 2     Medical Decision Making, by Problem:  Active Hospital Problems    Diagnosis   • Essential hypertension [I10]     Priority: High     ICD-10 transition     • AAA (abdominal aortic aneurysm) (HCC) [I71.4]   • Drug-induced constipation [K59.03]   • Urinary retention [R33.9]   • Leukocytosis [D72.829]   • Overweight (BMI 25.0-29.9) [E66.3]   • Closed fracture of neck of left femur (HCC) [S72.002A]   • Cirrhosis (HCC) [K74.60]     Plan:  Continue NPO and IV antibiotics. Will follow, will order SBFT in next couple of days to reassess terminal lieum    Quality Measures:  Quality-Core Measures    Discussed patient condition with Patient

## 2018-12-23 LAB
ANION GAP SERPL CALC-SCNC: 6 MMOL/L (ref 0–11.9)
BASOPHILS # BLD AUTO: 0.5 % (ref 0–1.8)
BASOPHILS # BLD: 0.05 K/UL (ref 0–0.12)
BUN SERPL-MCNC: 12 MG/DL (ref 8–22)
CALCIUM SERPL-MCNC: 9 MG/DL (ref 8.5–10.5)
CHLORIDE SERPL-SCNC: 102 MMOL/L (ref 96–112)
CO2 SERPL-SCNC: 25 MMOL/L (ref 20–33)
CREAT SERPL-MCNC: 1 MG/DL (ref 0.5–1.4)
EOSINOPHIL # BLD AUTO: 0.14 K/UL (ref 0–0.51)
EOSINOPHIL NFR BLD: 1.4 % (ref 0–6.9)
ERYTHROCYTE [DISTWIDTH] IN BLOOD BY AUTOMATED COUNT: 57.4 FL (ref 35.9–50)
GLUCOSE SERPL-MCNC: 102 MG/DL (ref 65–99)
HCT VFR BLD AUTO: 31.5 % (ref 37–47)
HGB BLD-MCNC: 10.4 G/DL (ref 12–16)
IMM GRANULOCYTES # BLD AUTO: 0.07 K/UL (ref 0–0.11)
IMM GRANULOCYTES NFR BLD AUTO: 0.7 % (ref 0–0.9)
LYMPHOCYTES # BLD AUTO: 1.27 K/UL (ref 1–4.8)
LYMPHOCYTES NFR BLD: 13 % (ref 22–41)
MCH RBC QN AUTO: 33.9 PG (ref 27–33)
MCHC RBC AUTO-ENTMCNC: 33 G/DL (ref 33.6–35)
MCV RBC AUTO: 102.6 FL (ref 81.4–97.8)
MONOCYTES # BLD AUTO: 0.9 K/UL (ref 0–0.85)
MONOCYTES NFR BLD AUTO: 9.2 % (ref 0–13.4)
NEUTROPHILS # BLD AUTO: 7.34 K/UL (ref 2–7.15)
NEUTROPHILS NFR BLD: 75.2 % (ref 44–72)
NRBC # BLD AUTO: 0 K/UL
NRBC BLD-RTO: 0 /100 WBC
PLATELET # BLD AUTO: 147 K/UL (ref 164–446)
PMV BLD AUTO: 10.8 FL (ref 9–12.9)
POTASSIUM SERPL-SCNC: 3.9 MMOL/L (ref 3.6–5.5)
RBC # BLD AUTO: 3.07 M/UL (ref 4.2–5.4)
SODIUM SERPL-SCNC: 133 MMOL/L (ref 135–145)
WBC # BLD AUTO: 9.8 K/UL (ref 4.8–10.8)

## 2018-12-23 PROCEDURE — 80048 BASIC METABOLIC PNL TOTAL CA: CPT

## 2018-12-23 PROCEDURE — 700101 HCHG RX REV CODE 250: Performed by: INTERNAL MEDICINE

## 2018-12-23 PROCEDURE — 97530 THERAPEUTIC ACTIVITIES: CPT

## 2018-12-23 PROCEDURE — 700105 HCHG RX REV CODE 258: Performed by: INTERNAL MEDICINE

## 2018-12-23 PROCEDURE — 97116 GAIT TRAINING THERAPY: CPT

## 2018-12-23 PROCEDURE — 36415 COLL VENOUS BLD VENIPUNCTURE: CPT

## 2018-12-23 PROCEDURE — 700102 HCHG RX REV CODE 250 W/ 637 OVERRIDE(OP): Performed by: INTERNAL MEDICINE

## 2018-12-23 PROCEDURE — A9270 NON-COVERED ITEM OR SERVICE: HCPCS | Performed by: NURSE PRACTITIONER

## 2018-12-23 PROCEDURE — 85025 COMPLETE CBC W/AUTO DIFF WBC: CPT

## 2018-12-23 PROCEDURE — A9270 NON-COVERED ITEM OR SERVICE: HCPCS | Performed by: HOSPITALIST

## 2018-12-23 PROCEDURE — 99232 SBSQ HOSP IP/OBS MODERATE 35: CPT | Performed by: INTERNAL MEDICINE

## 2018-12-23 PROCEDURE — A9270 NON-COVERED ITEM OR SERVICE: HCPCS | Performed by: INTERNAL MEDICINE

## 2018-12-23 PROCEDURE — 700111 HCHG RX REV CODE 636 W/ 250 OVERRIDE (IP): Performed by: INTERNAL MEDICINE

## 2018-12-23 PROCEDURE — A9270 NON-COVERED ITEM OR SERVICE: HCPCS | Performed by: PHYSICIAN ASSISTANT

## 2018-12-23 PROCEDURE — 770006 HCHG ROOM/CARE - MED/SURG/GYN SEMI*

## 2018-12-23 PROCEDURE — 700111 HCHG RX REV CODE 636 W/ 250 OVERRIDE (IP): Performed by: ORTHOPAEDIC SURGERY

## 2018-12-23 PROCEDURE — 700102 HCHG RX REV CODE 250 W/ 637 OVERRIDE(OP): Performed by: HOSPITALIST

## 2018-12-23 PROCEDURE — 700102 HCHG RX REV CODE 250 W/ 637 OVERRIDE(OP): Performed by: PHYSICIAN ASSISTANT

## 2018-12-23 PROCEDURE — 700102 HCHG RX REV CODE 250 W/ 637 OVERRIDE(OP): Performed by: NURSE PRACTITIONER

## 2018-12-23 RX ADMIN — METRONIDAZOLE 500 MG: 500 INJECTION, SOLUTION INTRAVENOUS at 06:37

## 2018-12-23 RX ADMIN — TIOTROPIUM BROMIDE 1 CAPSULE: 18 CAPSULE ORAL; RESPIRATORY (INHALATION) at 05:37

## 2018-12-23 RX ADMIN — GABAPENTIN 200 MG: 100 CAPSULE ORAL at 05:24

## 2018-12-23 RX ADMIN — METRONIDAZOLE 500 MG: 500 INJECTION, SOLUTION INTRAVENOUS at 21:27

## 2018-12-23 RX ADMIN — METRONIDAZOLE 500 MG: 500 INJECTION, SOLUTION INTRAVENOUS at 15:19

## 2018-12-23 RX ADMIN — STANDARDIZED SENNA CONCENTRATE AND DOCUSATE SODIUM 2 TABLET: 8.6; 5 TABLET, FILM COATED ORAL at 17:18

## 2018-12-23 RX ADMIN — LACTULOSE 30 ML: 20 SOLUTION ORAL at 05:24

## 2018-12-23 RX ADMIN — ALLOPURINOL 300 MG: 300 TABLET ORAL at 05:25

## 2018-12-23 RX ADMIN — CEFTRIAXONE SODIUM 2 G: 2 INJECTION, POWDER, FOR SOLUTION INTRAMUSCULAR; INTRAVENOUS at 05:22

## 2018-12-23 RX ADMIN — VALSARTAN 160 MG: 80 TABLET, FILM COATED ORAL at 05:24

## 2018-12-23 RX ADMIN — GABAPENTIN 200 MG: 100 CAPSULE ORAL at 17:18

## 2018-12-23 RX ADMIN — TRAMADOL HYDROCHLORIDE 100 MG: 50 TABLET, FILM COATED ORAL at 05:24

## 2018-12-23 RX ADMIN — GABAPENTIN 200 MG: 100 CAPSULE ORAL at 11:17

## 2018-12-23 RX ADMIN — OXYCODONE HYDROCHLORIDE 2.5 MG: 5 TABLET ORAL at 17:20

## 2018-12-23 RX ADMIN — ENOXAPARIN SODIUM 40 MG: 100 INJECTION SUBCUTANEOUS at 21:27

## 2018-12-23 RX ADMIN — SODIUM CHLORIDE, POTASSIUM CHLORIDE, SODIUM LACTATE AND CALCIUM CHLORIDE: 600; 310; 30; 20 INJECTION, SOLUTION INTRAVENOUS at 05:37

## 2018-12-23 RX ADMIN — SODIUM CHLORIDE, POTASSIUM CHLORIDE, SODIUM LACTATE AND CALCIUM CHLORIDE: 600; 310; 30; 20 INJECTION, SOLUTION INTRAVENOUS at 17:51

## 2018-12-23 RX ADMIN — LEVOTHYROXINE SODIUM 137 MCG: 137 TABLET ORAL at 05:25

## 2018-12-23 RX ADMIN — TAMSULOSIN HYDROCHLORIDE 0.4 MG: 0.4 CAPSULE ORAL at 08:31

## 2018-12-23 ASSESSMENT — ENCOUNTER SYMPTOMS
COUGH: 0
SHORTNESS OF BREATH: 0
RESPIRATORY NEGATIVE: 1
ABDOMINAL PAIN: 0
CARDIOVASCULAR NEGATIVE: 1
DIARRHEA: 0
HEADACHES: 0
CONSTIPATION: 0
FEVER: 0
GASTROINTESTINAL NEGATIVE: 1
NAUSEA: 0
VOMITING: 0

## 2018-12-23 ASSESSMENT — COGNITIVE AND FUNCTIONAL STATUS - GENERAL
TURNING FROM BACK TO SIDE WHILE IN FLAT BAD: A LITTLE
MOVING TO AND FROM BED TO CHAIR: A LITTLE
MOBILITY SCORE: 16
MOVING FROM LYING ON BACK TO SITTING ON SIDE OF FLAT BED: A LITTLE
STANDING UP FROM CHAIR USING ARMS: A LITTLE
CLIMB 3 TO 5 STEPS WITH RAILING: TOTAL
SUGGESTED CMS G CODE MODIFIER MOBILITY: CK
WALKING IN HOSPITAL ROOM: A LITTLE

## 2018-12-23 ASSESSMENT — PAIN SCALES - GENERAL
PAINLEVEL_OUTOF10: 0
PAINLEVEL_OUTOF10: 5
PAINLEVEL_OUTOF10: 3

## 2018-12-23 ASSESSMENT — GAIT ASSESSMENTS
ASSISTIVE DEVICE: FRONT WHEEL WALKER
DISTANCE (FEET): 9
GAIT LEVEL OF ASSIST: MINIMAL ASSIST

## 2018-12-23 NOTE — CARE PLAN
Problem: Safety  Goal: Will remain free from falls    Intervention: Assess risk factors for falls  Pt educated to call for assistance. Bed alarm on and activated.       Problem: Mobility  Goal: Risk for activity intolerance will decrease    Intervention: Assess and monitor signs of activity intolerance  Pt up to beside commode twice today

## 2018-12-23 NOTE — PROGRESS NOTES
Surgical Progress Note    Author: Jun Ramires Date & Time created: 2018   10:53 AM     Interval Events:    Review of Systems   Respiratory: Negative.    Cardiovascular: Negative.    Gastrointestinal: Negative.         Pain has completely resolved. Had a bowel movement     Hemodynamics:  Temp (24hrs), Av.3 °C (97.4 °F), Min:36 °C (96.8 °F), Max:37 °C (98.6 °F)  Temperature: 36 °C (96.8 °F)  Pulse  Av.9  Min: 55  Max: 88   Blood Pressure : 134/55     Respiratory:    Respiration: 16, Pulse Oximetry: 92 %        RUL Breath Sounds: Clear, RML Breath Sounds: Clear, RLL Breath Sounds: Diminished, JANETTE Breath Sounds: Clear, LLL Breath Sounds: Diminished  Neuro:  GCS       Fluids:  No intake or output data in the 24 hours ending 18 1053     Current Diet Order   Procedures   • Diet Order Clear Liquid     Physical Exam   Abdominal: Soft. Bowel sounds are normal. She exhibits no distension and no mass. There is no tenderness. There is no rebound and no guarding.     Labs:  Recent Results (from the past 24 hour(s))   CBC WITH DIFFERENTIAL    Collection Time: 18  6:48 AM   Result Value Ref Range    WBC 9.8 4.8 - 10.8 K/uL    RBC 3.07 (L) 4.20 - 5.40 M/uL    Hemoglobin 10.4 (L) 12.0 - 16.0 g/dL    Hematocrit 31.5 (L) 37.0 - 47.0 %    .6 (H) 81.4 - 97.8 fL    MCH 33.9 (H) 27.0 - 33.0 pg    MCHC 33.0 (L) 33.6 - 35.0 g/dL    RDW 57.4 (H) 35.9 - 50.0 fL    Platelet Count 147 (L) 164 - 446 K/uL    MPV 10.8 9.0 - 12.9 fL    Neutrophils-Polys 75.20 (H) 44.00 - 72.00 %    Lymphocytes 13.00 (L) 22.00 - 41.00 %    Monocytes 9.20 0.00 - 13.40 %    Eosinophils 1.40 0.00 - 6.90 %    Basophils 0.50 0.00 - 1.80 %    Immature Granulocytes 0.70 0.00 - 0.90 %    Nucleated RBC 0.00 /100 WBC    Neutrophils (Absolute) 7.34 (H) 2.00 - 7.15 K/uL    Lymphs (Absolute) 1.27 1.00 - 4.80 K/uL    Monos (Absolute) 0.90 (H) 0.00 - 0.85 K/uL    Eos (Absolute) 0.14 0.00 - 0.51 K/uL    Baso (Absolute) 0.05 0.00 - 0.12 K/uL     Immature Granulocytes (abs) 0.07 0.00 - 0.11 K/uL    NRBC (Absolute) 0.00 K/uL   BASIC METABOLIC PANEL    Collection Time: 12/23/18  6:48 AM   Result Value Ref Range    Sodium 133 (L) 135 - 145 mmol/L    Potassium 3.9 3.6 - 5.5 mmol/L    Chloride 102 96 - 112 mmol/L    Co2 25 20 - 33 mmol/L    Glucose 102 (H) 65 - 99 mg/dL    Bun 12 8 - 22 mg/dL    Creatinine 1.00 0.50 - 1.40 mg/dL    Calcium 9.0 8.5 - 10.5 mg/dL    Anion Gap 6.0 0.0 - 11.9   ESTIMATED GFR    Collection Time: 12/23/18  6:48 AM   Result Value Ref Range    GFR If African American >60 >60 mL/min/1.73 m 2    GFR If Non African American 54 (A) >60 mL/min/1.73 m 2     Medical Decision Making, by Problem:  Active Hospital Problems    Diagnosis   • Essential hypertension [I10]     Priority: High     ICD-10 transition     • AAA (abdominal aortic aneurysm) (HCC) [I71.4]   • Drug-induced constipation [K59.03]   • Urinary retention [R33.9]   • Ileitis [K52.9]   • Overweight (BMI 25.0-29.9) [E66.3]   • Closed fracture of neck of left femur (HCC) [S72.002A]   • Cirrhosis (HCC) [K74.60]     Plan:  Stable,clinically improving with no pain and normal wbc. Will start clears today    Quality Measures:  Quality-Core Measures    Discussed patient condition with Hospitalist and Patient

## 2018-12-23 NOTE — CARE PLAN
"Problem: Safety  Goal: Will remain free from injury  Outcome: PROGRESSING AS EXPECTED  Safety precautions in place. Call light within reach. Bed is low and in locked position. Hourly rounding in place.     Problem: Bowel/Gastric:  Goal: Normal bowel function is maintained or improved  Outcome: PROGRESSING AS EXPECTED  Pt had a BM today 12/23/18    Problem: Pain Management  Goal: Pain level will decrease to patient's comfort goal  Outcome: PROGRESSING AS EXPECTED  Pt stating \"no pain\". Will continue to assess.       "

## 2018-12-23 NOTE — PROGRESS NOTES
"   Orthopaedic PA Progress Note    Interval changes:Feeling better, tolerating clears, Ready for DC from [facility], needs routine postop follow-up next week with Dr. Alcala at [facility]     ROS - Patient denies any new issues. No chest pain, dyspnea, or fever.  Pain well controlled.    Blood pressure 134/55, pulse (!) 59, temperature 36 °C (96.8 °F), temperature source Temporal, resp. rate 16, height 1.6 m (5' 3\"), weight 74.7 kg (164 lb 10.9 oz), SpO2 92 %, not currently breastfeeding.    Patient seen and examined  No acute distress  Breathing non labored  RRR  Surgical dressing is clean, dry, and intact. Patient clearly fires tibialis anterior, EHL, and gastrocnemius/soleus. Sensation is intact to light touch throughout superficial peroneal, deep peroneal, tibial, saphenous, and sural nerve distributions. Strong and palpable 2+ dorsalis pedis and posterior tibial pulses with capillary refill less than 2 seconds. No lower leg tenderness or discomfort.    Recent Labs      12/21/18   0414  12/22/18   0417  12/23/18   0648   WBC  14.7*  10.7  9.8   RBC  3.20*  3.05*  3.07*   HEMOGLOBIN  11.2*  10.2*  10.4*   HEMATOCRIT  32.8*  31.5*  31.5*   MCV  102.5*  103.3*  102.6*   MCH  35.0*  33.4*  33.9*   MCHC  34.1  32.4*  33.0*   RDW  56.0*  56.4*  57.4*   PLATELETCT  157*  144*  147*   MPV  11.5  11.3  10.8       Active Hospital Problems    Diagnosis   • Essential hypertension [I10]     Priority: High     ICD-10 transition     • AAA (abdominal aortic aneurysm) (HCC) [I71.4]   • Drug-induced constipation [K59.03]   • Urinary retention [R33.9]   • Ileitis [K52.9]   • Overweight (BMI 25.0-29.9) [E66.3]   • Closed fracture of neck of left femur (HCC) [S72.002A]   • Cirrhosis (HCC) [K74.60]     Assessment/Plan:  POD#7 S/P L hip hemiarthroplasty  Wt bearing status - AT with PHP  PT/OT-initiated  Wound care:dressing left in place  Drains - no  Mendiola-no  Sutures/Staples out- 10-14 days post " operatively  Antibiotics:Rocephin  DVT Prophylaxis- TEDS/SCDs/Foot pumps.   Lovenox:40 mg daily, Duration-until ambulatory > 150'  Future Procedures - pending GenSurg re-eval  Case Coordination for Discharge Planning - Disposition Ready for transfer SNF when cleared by [facility]/Med

## 2018-12-23 NOTE — CARE PLAN
Problem: Communication  Goal: The ability to communicate needs accurately and effectively will improve    Intervention: [place] patient and significant other/support system to call light to alert staff of needs  Patient uses call light appropriately for all needs, and always calls staff before attempting to get out of bed to use the restroom.       Problem: Fluid Volume:  Goal: Will maintain balanced intake and output    Intervention: Monitor, educate, and encourage compliance with therapeutic intake of liquids  Patient NPO currently, but is receiving IV fluids at 100 ml/hr to remain hydrated appropriately.

## 2018-12-23 NOTE — PROGRESS NOTES
[facility] Medicine Daily Progress Note    Date of Service  12/23/2018    Chief Complaint  73 y.o. female admitted 12/15/2018 with fall.    Hospital Course    PMH HTN, HLD, cirrhosis who presented with mechanical fall and found with left femoral neck fracture. Dr. Alcala with ortho was consulted and patient had left hip hemiarthroplasty 12/16. She had leukocytosis and workup revealed terminal ileum inflammation on CT with RLQ tenderness on exam. She was started on ceftrixone/flagyl and surgery was consulted.        Interval Problem Update  She is alert and oriented, feels well  No abd tenderness on exam. WBC is normal, left shift still present    Consultants/Specialty  Ortho  Surgery    Code Status  Full    Disposition  SNF - [facility]  Sutures/Staples out- 10-14 days post operatively  WBAT    Review of Systems  Review of Systems   Constitutional: Negative for fever.   HENT: Negative for congestion.    Respiratory: Negative for cough and shortness of breath.    Cardiovascular: Negative for chest pain and leg swelling.   Gastrointestinal: Negative for abdominal pain, constipation, diarrhea, nausea and vomiting.   Genitourinary: Negative for dysuria and urgency.   Musculoskeletal: Positive for joint pain (minimal).   Skin: Negative for itching.   Neurological: Negative for headaches.        Physical Exam  Temp:  [36 °C (96.8 °F)-37 °C (98.6 °F)] 36 °C (96.8 °F)  Pulse:  [55-68] 59  Resp:  [16] 16  BP: (106-142)/(53-77) 134/55    Physical Exam   Constitutional: She is oriented to person, place, and time. She appears well-developed and well-nourished.   HENT:   Head: Normocephalic.   Mouth/Throat: Oropharynx is clear and moist. No oropharyngeal exudate.   Eyes: Conjunctivae are normal. Right eye exhibits no discharge. Left eye exhibits no discharge.   Cardiovascular: Normal rate and regular rhythm.    Pulmonary/Chest: Effort normal. She has no wheezes. She has no rales.   Abdominal: Soft. There is no tenderness. There is  no rebound and no guarding.   Musculoskeletal: She exhibits no edema.   Left hip with clean dressing in place, mildly tender   Lymphadenopathy:     She has no cervical adenopathy.   Neurological: She is alert and oriented to person, place, and time.   Skin: Skin is warm and dry.   Psychiatric: She is attentive.   Nursing note and vitals reviewed.      Fluids  No intake or output data in the 24 hours ending 12/23/18 1042    Laboratory  Recent Labs      12/21/18 0414  12/22/18 0417 12/23/18   0648   WBC  14.7*  10.7  9.8   RBC  3.20*  3.05*  3.07*   HEMOGLOBIN  11.2*  10.2*  10.4*   HEMATOCRIT  32.8*  31.5*  31.5*   MCV  102.5*  103.3*  102.6*   MCH  35.0*  33.4*  33.9*   MCHC  34.1  32.4*  33.0*   RDW  56.0*  56.4*  57.4*   PLATELETCT  157*  144*  147*   MPV  11.5  11.3  10.8     Recent Labs      12/21/18 0414 12/22/18 0417 12/23/18   0648   SODIUM  133*  134*  133*   POTASSIUM  4.1  4.1  3.9   CHLORIDE  100  101  102   CO2  23  28  25   GLUCOSE  144*  94  102*   BUN  13  12  12   CREATININE  0.91  0.91  1.00   CALCIUM  9.4  9.1  9.0                   Imaging  US-APPENDIX   Final Result      The appendix is not delineated.      CT-ABDOMEN-PELVIS WITH   Final Result      1.  Thickening of the terminal ileum related to infectious or inflammatory enteritis. Crohn disease is a consideration. This would be an unusual appearance for ischemic enteritis.   2.  Trace RIGHT pleural effusion   3.  Probable tiny BILATERAL renal cysts   4.  Atherosclerosis   5.  Interval enlargement of abdominal aortic aneurysm measuring 4.2 x 4.7 cm   6.  Appendix not visualized   7.  Prior posterior decompression and instrumented fusion spanning T12-S1               DX-CHEST-PORTABLE (1 VIEW)   Final Result      Mild cardiomegaly. No focal consolidation or pleural effusions.      DX-PELVIS-1 OR 2 VIEWS   Final Result      Satisfactory positioning, in AP view, of new left hip hemiarthroplasty.      DX-PELVIS-1 OR 2 VIEWS   Final  Result      1.  LEFT femoral neck fracture with displacement and varus stimulation.   2.  No pelvic fracture demonstrated.      DX-FEMUR-1 VIEW LEFT   Final Result      Acute displaced fracture of the LEFT femoral neck with varus angulation.      DX-CHEST-LIMITED (1 VIEW)   Final Result      No acute cardiopulmonary disease.           Assessment/Plan  * Closed fracture of neck of left femur (HCC)- (present on admission)   Assessment & Plan    S/p repair with Dr. Alcala  PTOT  Pain control     Essential hypertension- (present on admission)   Assessment & Plan    Controlled with current medication regimen.  Monitor.      AAA (abdominal aortic aneurysm) ([facility])   Assessment & Plan    Infrarenal abdominal aortic aneurysm measures up to 4.2 x 4.7 cm, previously 3.8 x 2.8 cm.  F/u imaging as outpatient for monitoring  BP control     Drug-induced constipation   Assessment & Plan    Will added scheduled tylenol to gabapentin and PRN tramadol to help with pain control and minimize narcotic use  Senakot BID and daily miralax     Ileitis   Assessment & Plan    Leukocytosis noted on admission  CXR had mild cardiomegaly. Blood cultures are negative. Urine culture was negative.  She has RLQ tenderness. CT showed inflammation of terminal ileum, appendix not seen. US did not visualized appendix. Started on ceftriaxone with improvement of abd tenderness  Surgery consulted, remain NPO, possible SBFT     Urinary retention   Assessment & Plan    May be contributing to increase creatinine, improved  Started on flomax  Mendiola cath removed 12/21 and monitor with bladder scans     Overweight (BMI 25.0-29.9)- (present on admission)   Assessment & Plan    Body mass index is 29.76 kg/m².       Cirrhosis (HCC)- (present on admission)   Assessment & Plan    History of  Without evidence of end-stage disease.  Unclear etiology.  Outpatient follow-up           VTE prophylaxis: lovenox

## 2018-12-24 LAB
ANION GAP SERPL CALC-SCNC: 6 MMOL/L (ref 0–11.9)
BACTERIA BLD CULT: NORMAL
BACTERIA BLD CULT: NORMAL
BASOPHILS # BLD AUTO: 0.5 % (ref 0–1.8)
BASOPHILS # BLD: 0.05 K/UL (ref 0–0.12)
BUN SERPL-MCNC: 12 MG/DL (ref 8–22)
CALCIUM SERPL-MCNC: 9 MG/DL (ref 8.5–10.5)
CHLORIDE SERPL-SCNC: 104 MMOL/L (ref 96–112)
CO2 SERPL-SCNC: 29 MMOL/L (ref 20–33)
CREAT SERPL-MCNC: 1.03 MG/DL (ref 0.5–1.4)
EOSINOPHIL # BLD AUTO: 0.17 K/UL (ref 0–0.51)
EOSINOPHIL NFR BLD: 1.7 % (ref 0–6.9)
ERYTHROCYTE [DISTWIDTH] IN BLOOD BY AUTOMATED COUNT: 58.8 FL (ref 35.9–50)
GLUCOSE SERPL-MCNC: 101 MG/DL (ref 65–99)
HCT VFR BLD AUTO: 33.6 % (ref 37–47)
HGB BLD-MCNC: 10.9 G/DL (ref 12–16)
IMM GRANULOCYTES # BLD AUTO: 0.08 K/UL (ref 0–0.11)
IMM GRANULOCYTES NFR BLD AUTO: 0.8 % (ref 0–0.9)
LYMPHOCYTES # BLD AUTO: 1.77 K/UL (ref 1–4.8)
LYMPHOCYTES NFR BLD: 17.6 % (ref 22–41)
MCH RBC QN AUTO: 33.5 PG (ref 27–33)
MCHC RBC AUTO-ENTMCNC: 32.4 G/DL (ref 33.6–35)
MCV RBC AUTO: 103.4 FL (ref 81.4–97.8)
MONOCYTES # BLD AUTO: 0.99 K/UL (ref 0–0.85)
MONOCYTES NFR BLD AUTO: 9.9 % (ref 0–13.4)
NEUTROPHILS # BLD AUTO: 6.97 K/UL (ref 2–7.15)
NEUTROPHILS NFR BLD: 69.5 % (ref 44–72)
NRBC # BLD AUTO: 0 K/UL
NRBC BLD-RTO: 0 /100 WBC
PLATELET # BLD AUTO: 170 K/UL (ref 164–446)
PMV BLD AUTO: 11.3 FL (ref 9–12.9)
POTASSIUM SERPL-SCNC: 3.8 MMOL/L (ref 3.6–5.5)
RBC # BLD AUTO: 3.25 M/UL (ref 4.2–5.4)
SIGNIFICANT IND 70042: NORMAL
SIGNIFICANT IND 70042: NORMAL
SITE SITE: NORMAL
SITE SITE: NORMAL
SODIUM SERPL-SCNC: 139 MMOL/L (ref 135–145)
SOURCE SOURCE: NORMAL
SOURCE SOURCE: NORMAL
WBC # BLD AUTO: 10 K/UL (ref 4.8–10.8)

## 2018-12-24 PROCEDURE — 80048 BASIC METABOLIC PNL TOTAL CA: CPT

## 2018-12-24 PROCEDURE — 700111 HCHG RX REV CODE 636 W/ 250 OVERRIDE (IP): Performed by: ORTHOPAEDIC SURGERY

## 2018-12-24 PROCEDURE — A9270 NON-COVERED ITEM OR SERVICE: HCPCS | Performed by: HOSPITALIST

## 2018-12-24 PROCEDURE — A9270 NON-COVERED ITEM OR SERVICE: HCPCS | Performed by: PHYSICIAN ASSISTANT

## 2018-12-24 PROCEDURE — 700111 HCHG RX REV CODE 636 W/ 250 OVERRIDE (IP): Performed by: INTERNAL MEDICINE

## 2018-12-24 PROCEDURE — 700102 HCHG RX REV CODE 250 W/ 637 OVERRIDE(OP): Performed by: HOSPITALIST

## 2018-12-24 PROCEDURE — 36415 COLL VENOUS BLD VENIPUNCTURE: CPT

## 2018-12-24 PROCEDURE — 770006 HCHG ROOM/CARE - MED/SURG/GYN SEMI*

## 2018-12-24 PROCEDURE — 700102 HCHG RX REV CODE 250 W/ 637 OVERRIDE(OP): Performed by: NURSE PRACTITIONER

## 2018-12-24 PROCEDURE — 97116 GAIT TRAINING THERAPY: CPT

## 2018-12-24 PROCEDURE — 85025 COMPLETE CBC W/AUTO DIFF WBC: CPT

## 2018-12-24 PROCEDURE — 92526 ORAL FUNCTION THERAPY: CPT

## 2018-12-24 PROCEDURE — A9270 NON-COVERED ITEM OR SERVICE: HCPCS | Performed by: INTERNAL MEDICINE

## 2018-12-24 PROCEDURE — 700102 HCHG RX REV CODE 250 W/ 637 OVERRIDE(OP): Performed by: INTERNAL MEDICINE

## 2018-12-24 PROCEDURE — 99232 SBSQ HOSP IP/OBS MODERATE 35: CPT | Performed by: INTERNAL MEDICINE

## 2018-12-24 PROCEDURE — 700105 HCHG RX REV CODE 258: Performed by: INTERNAL MEDICINE

## 2018-12-24 PROCEDURE — 700101 HCHG RX REV CODE 250: Performed by: INTERNAL MEDICINE

## 2018-12-24 PROCEDURE — 700102 HCHG RX REV CODE 250 W/ 637 OVERRIDE(OP): Performed by: PHYSICIAN ASSISTANT

## 2018-12-24 PROCEDURE — G8997 SWALLOW GOAL STATUS: HCPCS | Mod: CI

## 2018-12-24 PROCEDURE — 97530 THERAPEUTIC ACTIVITIES: CPT

## 2018-12-24 PROCEDURE — A9270 NON-COVERED ITEM OR SERVICE: HCPCS | Performed by: NURSE PRACTITIONER

## 2018-12-24 PROCEDURE — G8998 SWALLOW D/C STATUS: HCPCS | Mod: CH

## 2018-12-24 RX ADMIN — TIOTROPIUM BROMIDE 1 CAPSULE: 18 CAPSULE ORAL; RESPIRATORY (INHALATION) at 05:15

## 2018-12-24 RX ADMIN — TRAMADOL HYDROCHLORIDE 50 MG: 50 TABLET, FILM COATED ORAL at 17:19

## 2018-12-24 RX ADMIN — ALLOPURINOL 300 MG: 300 TABLET ORAL at 05:15

## 2018-12-24 RX ADMIN — GABAPENTIN 200 MG: 100 CAPSULE ORAL at 17:19

## 2018-12-24 RX ADMIN — GABAPENTIN 200 MG: 100 CAPSULE ORAL at 05:14

## 2018-12-24 RX ADMIN — METRONIDAZOLE 500 MG: 500 INJECTION, SOLUTION INTRAVENOUS at 13:36

## 2018-12-24 RX ADMIN — LACTULOSE 30 ML: 20 SOLUTION ORAL at 06:19

## 2018-12-24 RX ADMIN — LEVOTHYROXINE SODIUM 137 MCG: 137 TABLET ORAL at 05:15

## 2018-12-24 RX ADMIN — GABAPENTIN 200 MG: 100 CAPSULE ORAL at 12:27

## 2018-12-24 RX ADMIN — TAMSULOSIN HYDROCHLORIDE 0.4 MG: 0.4 CAPSULE ORAL at 09:42

## 2018-12-24 RX ADMIN — SODIUM CHLORIDE, POTASSIUM CHLORIDE, SODIUM LACTATE AND CALCIUM CHLORIDE: 600; 310; 30; 20 INJECTION, SOLUTION INTRAVENOUS at 05:11

## 2018-12-24 RX ADMIN — ENOXAPARIN SODIUM 40 MG: 100 INJECTION SUBCUTANEOUS at 21:28

## 2018-12-24 RX ADMIN — HYDRALAZINE HYDROCHLORIDE 10 MG: 20 INJECTION INTRAMUSCULAR; INTRAVENOUS at 21:28

## 2018-12-24 RX ADMIN — CEFTRIAXONE SODIUM 2 G: 2 INJECTION, POWDER, FOR SOLUTION INTRAMUSCULAR; INTRAVENOUS at 05:12

## 2018-12-24 RX ADMIN — METRONIDAZOLE 500 MG: 500 INJECTION, SOLUTION INTRAVENOUS at 21:28

## 2018-12-24 RX ADMIN — OXYCODONE HYDROCHLORIDE 2.5 MG: 5 TABLET ORAL at 15:22

## 2018-12-24 RX ADMIN — METRONIDAZOLE 500 MG: 500 INJECTION, SOLUTION INTRAVENOUS at 06:19

## 2018-12-24 RX ADMIN — STANDARDIZED SENNA CONCENTRATE AND DOCUSATE SODIUM 2 TABLET: 8.6; 5 TABLET, FILM COATED ORAL at 17:19

## 2018-12-24 RX ADMIN — OXYCODONE HYDROCHLORIDE 2.5 MG: 5 TABLET ORAL at 23:22

## 2018-12-24 ASSESSMENT — ENCOUNTER SYMPTOMS
VOMITING: 0
DIARRHEA: 0
HEADACHES: 0
CONSTIPATION: 0
FEVER: 0
SHORTNESS OF BREATH: 0
ABDOMINAL PAIN: 0
COUGH: 0
NAUSEA: 0

## 2018-12-24 ASSESSMENT — COGNITIVE AND FUNCTIONAL STATUS - GENERAL
MOVING FROM LYING ON BACK TO SITTING ON SIDE OF FLAT BED: A LITTLE
STANDING UP FROM CHAIR USING ARMS: A LITTLE
MOVING TO AND FROM BED TO CHAIR: A LITTLE
SUGGESTED CMS G CODE MODIFIER MOBILITY: CK
MOBILITY SCORE: 16
TURNING FROM BACK TO SIDE WHILE IN FLAT BAD: A LITTLE
WALKING IN HOSPITAL ROOM: A LITTLE
CLIMB 3 TO 5 STEPS WITH RAILING: TOTAL

## 2018-12-24 ASSESSMENT — PAIN SCALES - GENERAL
PAINLEVEL_OUTOF10: 5
PAINLEVEL_OUTOF10: 8
PAINLEVEL_OUTOF10: 3
PAINLEVEL_OUTOF10: 8
PAINLEVEL_OUTOF10: 8

## 2018-12-24 ASSESSMENT — GAIT ASSESSMENTS
ASSISTIVE DEVICE: FRONT WHEEL WALKER
DISTANCE (FEET): 25
GAIT LEVEL OF ASSIST: MINIMAL ASSIST

## 2018-12-24 NOTE — PROGRESS NOTES
Surgical Progress Note    Author: Jef Chaudhry Date & Time created: 2018   9:21 AM     Interval Events:  Denies abdominal pain.  Had BM yesterday, is passing flatus, is toleraing clear liquid diet.    Review of Systems   Gastrointestinal: Negative for abdominal pain, nausea and vomiting.     Hemodynamics:  Temp (24hrs), Av.6 °C (97.9 °F), Min:36.1 °C (96.9 °F), Max:36.9 °C (98.4 °F)  Temperature: 36.1 °C (96.9 °F)  Pulse  Av.1  Min: 55  Max: 93   Blood Pressure : (!) 98/57 (RN notified)     Respiratory:    Respiration: 16, Pulse Oximetry: 91 %        RUL Breath Sounds: Clear, RML Breath Sounds: Clear, RLL Breath Sounds: Diminished, JANETTE Breath Sounds: Clear, LLL Breath Sounds: Diminished  Neuro:  GCS = 15       Fluids:  No intake or output data in the 24 hours ending 18 0921     Current Diet Order   Procedures   • Diet Order Clear Liquid     Physical Exam   Constitutional: She is oriented to person, place, and time. She appears well-developed and well-nourished. No distress.   Eyes: Pupils are equal, round, and reactive to light.   Cardiovascular: Normal rate, regular rhythm and normal heart sounds.    Pulmonary/Chest: Effort normal and breath sounds normal. No respiratory distress.   Abdominal: Soft. Bowel sounds are normal. She exhibits no distension. There is no tenderness. There is no rebound and no guarding.   Neurological: She is alert and oriented to person, place, and time.     Labs:  Recent Results (from the past 24 hour(s))   CBC WITH DIFFERENTIAL    Collection Time: 18  3:52 AM   Result Value Ref Range    WBC 10.0 4.8 - 10.8 K/uL    RBC 3.25 (L) 4.20 - 5.40 M/uL    Hemoglobin 10.9 (L) 12.0 - 16.0 g/dL    Hematocrit 33.6 (L) 37.0 - 47.0 %    .4 (H) 81.4 - 97.8 fL    MCH 33.5 (H) 27.0 - 33.0 pg    MCHC 32.4 (L) 33.6 - 35.0 g/dL    RDW 58.8 (H) 35.9 - 50.0 fL    Platelet Count 170 164 - 446 K/uL    MPV 11.3 9.0 - 12.9 fL    Neutrophils-Polys 69.50 44.00 - 72.00 %     Lymphocytes 17.60 (L) 22.00 - 41.00 %    Monocytes 9.90 0.00 - 13.40 %    Eosinophils 1.70 0.00 - 6.90 %    Basophils 0.50 0.00 - 1.80 %    Immature Granulocytes 0.80 0.00 - 0.90 %    Nucleated RBC 0.00 /100 WBC    Neutrophils (Absolute) 6.97 2.00 - 7.15 K/uL    Lymphs (Absolute) 1.77 1.00 - 4.80 K/uL    Monos (Absolute) 0.99 (H) 0.00 - 0.85 K/uL    Eos (Absolute) 0.17 0.00 - 0.51 K/uL    Baso (Absolute) 0.05 0.00 - 0.12 K/uL    Immature Granulocytes (abs) 0.08 0.00 - 0.11 K/uL    NRBC (Absolute) 0.00 K/uL   BASIC METABOLIC PANEL    Collection Time: 12/24/18  3:52 AM   Result Value Ref Range    Sodium 139 135 - 145 mmol/L    Potassium 3.8 3.6 - 5.5 mmol/L    Chloride 104 96 - 112 mmol/L    Co2 29 20 - 33 mmol/L    Glucose 101 (H) 65 - 99 mg/dL    Bun 12 8 - 22 mg/dL    Creatinine 1.03 0.50 - 1.40 mg/dL    Calcium 9.0 8.5 - 10.5 mg/dL    Anion Gap 6.0 0.0 - 11.9   ESTIMATED GFR    Collection Time: 12/24/18  3:52 AM   Result Value Ref Range    GFR If African American >60 >60 mL/min/1.73 m 2    GFR If Non African American 52 (A) >60 mL/min/1.73 m 2     Medical Decision Making, by Problem:  Active Hospital Problems    Diagnosis   • Essential hypertension [I10]     Priority: High     ICD-10 transition     • AAA (abdominal aortic aneurysm) (HCC) [I71.4]   • Drug-induced constipation [K59.03]   • Urinary retention [R33.9]   • Ileitis [K52.9]   • Overweight (BMI 25.0-29.9) [E66.3]   • Closed fracture of neck of left femur (HCC) [S72.002A]   • Cirrhosis (HCC) [K74.60]     Plan:  Abdominal pain resolved.  Advance to low residue diet.  Okay for discharge home, if she tolerates diet.  Will check to see if still in house tomorrow, and will see in house if she is.  No follow up with Dr. Ramires required at this time.    Quality Measures:  Quality-Core Measures    Discussed patient condition with Patient

## 2018-12-24 NOTE — DISCHARGE PLANNING
Received Transport Form @ [id number]  Spoke to Jessee @ [facility]  Does not have a bed today, may be able to accept tomorrow 12/25/18. Cannot set up transportation today.

## 2018-12-24 NOTE — PROGRESS NOTES
[facility] Medicine Daily Progress Note    Date of Service  12/24/2018    Chief Complaint  73 y.o. female admitted 12/15/2018 with fall.    Hospital Course    PMH HTN, HLD, cirrhosis who presented with mechanical fall and found with left femoral neck fracture. Dr. Alcala with ortho was consulted and patient had left hip hemiarthroplasty 12/16. She had leukocytosis and workup revealed terminal ileum inflammation on CT with RLQ tenderness on exam. She was started on ceftrixone/flagyl and surgery was consulted. He pain and leukocytosis resolved. Her diet was slowly advanced to soft GI diet and doing well. She will be discharged to complete course of ceftriaxone and flagyl for ileitis.         Interval Problem Update  Tolerating clears and no tenderness on exam.  Continue to complete course of ceftriaxone/flagyl  Advance diet per surgery    Consultants/Specialty  Ortho  Surgery    Code Status  Full    Disposition  SNF - [facility] tomorrow if tolerates diet  Sutures/Staples out- 10-14 days post operatively  WBAT    Review of Systems  Review of Systems   Constitutional: Negative for fever and malaise/fatigue.   HENT: Negative for congestion.    Respiratory: Negative for cough and shortness of breath.    Cardiovascular: Negative for chest pain.   Gastrointestinal: Negative for abdominal pain, constipation, diarrhea, nausea and vomiting.   Genitourinary: Negative for dysuria and urgency.   Musculoskeletal: Positive for joint pain (minimal).   Skin: Negative for itching.   Neurological: Negative for headaches.        Physical Exam  Temp:  [36.1 °C (96.9 °F)-36.9 °C (98.4 °F)] 36.5 °C (97.7 °F)  Pulse:  [61-93] 74  Resp:  [16-17] 16  BP: ()/(51-76) 178/71    Physical Exam   Constitutional: She is oriented to person, place, and time. She appears well-developed and well-nourished.   HENT:   Head: Normocephalic.   Mouth/Throat: Oropharynx is clear and moist. No oropharyngeal exudate.   Eyes: Conjunctivae are normal. Right eye  exhibits no discharge. Left eye exhibits no discharge.   Cardiovascular: Normal rate and regular rhythm.    Pulmonary/Chest: Effort normal. She has no wheezes. She has no rales.   Abdominal: Soft. She exhibits no distension. There is no tenderness. There is no rebound and no guarding.   Musculoskeletal: She exhibits no edema.   Left hip with clean dressing in place, mildly tender   Lymphadenopathy:     She has no cervical adenopathy.   Neurological: She is alert and oriented to person, place, and time.   Skin: Skin is warm and dry.   Psychiatric: She is attentive.   Nursing note and vitals reviewed.      Fluids  No intake or output data in the 24 hours ending 12/24/18 1008    Laboratory  Recent Labs      12/22/18 0417 12/23/18   0648  12/24/18   0352   WBC  10.7  9.8  10.0   RBC  3.05*  3.07*  3.25*   HEMOGLOBIN  10.2*  10.4*  10.9*   HEMATOCRIT  31.5*  31.5*  33.6*   MCV  103.3*  102.6*  103.4*   MCH  33.4*  33.9*  33.5*   MCHC  32.4*  33.0*  32.4*   RDW  56.4*  57.4*  58.8*   PLATELETCT  144*  147*  170   MPV  11.3  10.8  11.3     Recent Labs      12/22/18 0417  12/23/18   0648  12/24/18   0352   SODIUM  134*  133*  139   POTASSIUM  4.1  3.9  3.8   CHLORIDE  101  102  104   CO2  28  25  29   GLUCOSE  94  102*  101*   BUN  12  12  12   CREATININE  0.91  1.00  1.03   CALCIUM  9.1  9.0  9.0                   Imaging  US-APPENDIX   Final Result      The appendix is not delineated.      CT-ABDOMEN-PELVIS WITH   Final Result      1.  Thickening of the terminal ileum related to infectious or inflammatory enteritis. Crohn disease is a consideration. This would be an unusual appearance for ischemic enteritis.   2.  Trace RIGHT pleural effusion   3.  Probable tiny BILATERAL renal cysts   4.  Atherosclerosis   5.  Interval enlargement of abdominal aortic aneurysm measuring 4.2 x 4.7 cm   6.  Appendix not visualized   7.  Prior posterior decompression and instrumented fusion spanning T12-S1                DX-CHEST-PORTABLE (1 VIEW)   Final Result      Mild cardiomegaly. No focal consolidation or pleural effusions.      DX-PELVIS-1 OR 2 VIEWS   Final Result      Satisfactory positioning, in AP view, of new left hip hemiarthroplasty.      DX-PELVIS-1 OR 2 VIEWS   Final Result      1.  LEFT femoral neck fracture with displacement and varus stimulation.   2.  No pelvic fracture demonstrated.      DX-FEMUR-1 VIEW LEFT   Final Result      Acute displaced fracture of the LEFT femoral neck with varus angulation.      DX-CHEST-LIMITED (1 VIEW)   Final Result      No acute cardiopulmonary disease.           Assessment/Plan  * Closed fracture of neck of left femur (HCC)- (present on admission)   Assessment & Plan    S/p repair with Dr. Alcala  PTOT  Pain control     Essential hypertension- (present on admission)   Assessment & Plan    Controlled with current medication regimen.  Monitor.      AAA (abdominal aortic aneurysm) ([facility])   Assessment & Plan    Infrarenal abdominal aortic aneurysm measures up to 4.2 x 4.7 cm, previously 3.8 x 2.8 cm.  F/u imaging as outpatient for monitoring  BP control     Drug-induced constipation   Assessment & Plan    Will added scheduled tylenol to gabapentin and PRN tramadol to help with pain control and minimize narcotic use  Senakot BID and daily miralax     Ileitis   Assessment & Plan    Leukocytosis noted on admission  CXR had mild cardiomegaly. Blood cultures are negative. Urine culture was negative.  She has RLQ tenderness. CT showed inflammation of terminal ileum, appendix not seen. US did not visualized appendix.   Surgery consulted, diet slowly advanced and tolerating  Continue course of ceftriaxone and flagyl for ileitis     Urinary retention   Assessment & Plan    May be contributing to increase creatinine, improved  Started on flomax  Mendiola cath removed 12/21 and monitor with bladder scans     Overweight (BMI 25.0-29.9)- (present on admission)   Assessment & Plan    Body mass index  is 29.76 kg/m².       Cirrhosis (HCC)- (present on admission)   Assessment & Plan    History of  Without evidence of end-stage disease.  Unclear etiology.  Outpatient follow-up           VTE prophylaxis: lovenox

## 2018-12-24 NOTE — THERAPY
"Physical Therapy Treatment completed.   Bed Mobility:  Supine to Sit: Contact Guard Assist  Transfers: Sit to Stand: Contact Guard Assist  Gait: Level Of Assist: Minimal Assist with Front-Wheel Walker       Plan of Care: Will benefit from Physical Therapy 5 times per week  Discharge Recommendations: Equipment: Will Continue to Assess for Equipment Needs. Recommend inpatient transitional care services for continued physical therapy services.      See \"Rehab Therapy-Acute\" Patient Summary Report for complete documentation.     Pt is progressing slower than expected and continues to be limtied by pain and weakness. Pt did demonstrate improvement in gait and activity tolerance this session once the FWW was adjusted to the correct height. Pt was able to demonstrate with improved UE use to off load L LE during ambulation for improved advancement. Pt demonstrated with ky LOB during ambulation when attempting to sit down very early without FWW; pt tends to leave FWW to side during transfers, continues to be limited by poor saftey awareness. Pt will continue to benefit from skilled PT while in house, with recommendation for post acute therapy prior to d/c home given current obejctive findings.   "

## 2018-12-24 NOTE — PROGRESS NOTES
"   Orthopaedic PA Progress Note    Interval changes:Better, trying regular diet, expecting transfer soon    ROS - Patient denies any new issues. No chest pain, dyspnea, or fever.  Pain well controlled.    Blood pressure (!) 178/71, pulse 74, temperature 36.5 °C (97.7 °F), temperature source Temporal, resp. rate 16, height 1.6 m (5' 3\"), weight 74.7 kg (164 lb 10.9 oz), SpO2 91 %, not currently breastfeeding.    Patient seen and examined  No acute distress  Breathing non labored  RRR  Surgical dressing is clean, dry, and intact. Patient clearly fires tibialis anterior, EHL, and gastrocnemius/soleus. Sensation is intact to light touch throughout superficial peroneal, deep peroneal, tibial, saphenous, and sural nerve distributions. Strong and palpable 2+ dorsalis pedis and posterior tibial pulses with capillary refill less than 2 seconds. No lower leg tenderness or discomfort.    Recent Labs      12/22/18   0417  12/23/18   0648  12/24/18   0352   WBC  10.7  9.8  10.0   RBC  3.05*  3.07*  3.25*   HEMOGLOBIN  10.2*  10.4*  10.9*   HEMATOCRIT  31.5*  31.5*  33.6*   MCV  103.3*  102.6*  103.4*   MCH  33.4*  33.9*  33.5*   MCHC  32.4*  33.0*  32.4*   RDW  56.4*  57.4*  58.8*   PLATELETCT  144*  147*  170   MPV  11.3  10.8  11.3       Active Hospital Problems    Diagnosis   • Essential hypertension [I10]     Priority: High     ICD-10 transition     • AAA (abdominal aortic aneurysm) (HCC) [I71.4]   • Drug-induced constipation [K59.03]   • Urinary retention [R33.9]   • Ileitis [K52.9]   • Overweight (BMI 25.0-29.9) [E66.3]   • Closed fracture of neck of left femur (HCC) [S72.002A]   • Cirrhosis (HCC) [K74.60]       Assessment/Plan:  POD#8 S/P L hip hemiarthroplasty  Wt bearing status - AT with PHP  PT/OT-initiated  Wound care:dressing left in place  Drains - no  Mendiola-no  Sutures/Staples out- 10-14 days post operatively  Antibiotics:Rocephin  DVT Prophylaxis- TEDS/SCDs/Foot pumps.   Lovenox:40 mg daily, Duration-until " ambulatory > 150'  [facility]  Case Coordination for Discharge Planning - Disposition Ready for transfer SNF when cleared by [facility]/Med

## 2018-12-24 NOTE — CARE PLAN
Problem: Safety  Goal: Will remain free from falls    Intervention: Implement fall precautions  Bed alarm on and in use. Patient frequently reminded to use call light before attempting to get out of bed. Patient will occasionally forget and start to get out of bed without staff present. Charting done near patient room. Non slip socks on, call light within reach, assistive devices out of sight, frequent toileting offered.      Problem: Fluid Volume:  Goal: Will maintain balanced intake and output    Intervention: Monitor, educate, and encourage compliance with therapeutic intake of liquids  Educated to increase oral fluid intake. Patient is also receiving IV fluid hydration.

## 2018-12-24 NOTE — CARE PLAN
Problem: Safety  Goal: Will remain free from injury  Outcome: PROGRESSING AS EXPECTED  Treaded socks in place, bed in the lowest position, bed alarm on, call light and belongings within reach, pt call for assistance appropriately    Problem: Mobility  Goal: Risk for activity intolerance will decrease  Outcome: PROGRESSING AS EXPECTED  Pt. Up to chair and bedside commode, x1 assist with FWW.

## 2018-12-24 NOTE — THERAPY
"Speech Language Therapy dysphagia treatment completed.     Functional Status: Patient was seen on this date for dysphagia treatment with a GI soft diet. Patient placed lower denture plate for session. Per patient she has been tolerating diet and taking pills whole without difficulty. She consumed items from meal tray including turkey sandwich and serial sips of thin via straw with no overt s/sx of aspiration. Vocal quality remained clear all session. She was educated about current status and SLP recs. At this time, patient is tolerating current diet and would recommend continue GI soft/D3 thin liquid diet (baseline diet). No further SLP services warranted to address dysphagia. Please re-consult with change in status.     Recommendations: continue GI soft/Dysphagia III with thin liquids     Plan of Care: Patient with no further skilled SLP needs in the acute care setting at this time    Post-Acute Therapy: Currently anticipate no further skilled therapy needs once patient is discharged from the inpatient setting.    See \"Rehab Therapy-Acute\" Patient Summary Report for complete documentation. SLP to sign off. Thank you for the consult.     "

## 2018-12-25 PROCEDURE — 700102 HCHG RX REV CODE 250 W/ 637 OVERRIDE(OP): Performed by: INTERNAL MEDICINE

## 2018-12-25 PROCEDURE — A9270 NON-COVERED ITEM OR SERVICE: HCPCS | Performed by: INTERNAL MEDICINE

## 2018-12-25 PROCEDURE — A9270 NON-COVERED ITEM OR SERVICE: HCPCS | Performed by: PHYSICIAN ASSISTANT

## 2018-12-25 PROCEDURE — 700105 HCHG RX REV CODE 258: Performed by: INTERNAL MEDICINE

## 2018-12-25 PROCEDURE — 700111 HCHG RX REV CODE 636 W/ 250 OVERRIDE (IP): Performed by: ORTHOPAEDIC SURGERY

## 2018-12-25 PROCEDURE — 700102 HCHG RX REV CODE 250 W/ 637 OVERRIDE(OP): Performed by: PHYSICIAN ASSISTANT

## 2018-12-25 PROCEDURE — 700102 HCHG RX REV CODE 250 W/ 637 OVERRIDE(OP): Performed by: HOSPITALIST

## 2018-12-25 PROCEDURE — A9270 NON-COVERED ITEM OR SERVICE: HCPCS | Performed by: NURSE PRACTITIONER

## 2018-12-25 PROCEDURE — A9270 NON-COVERED ITEM OR SERVICE: HCPCS | Performed by: HOSPITALIST

## 2018-12-25 PROCEDURE — 700111 HCHG RX REV CODE 636 W/ 250 OVERRIDE (IP): Performed by: INTERNAL MEDICINE

## 2018-12-25 PROCEDURE — 700105 HCHG RX REV CODE 258

## 2018-12-25 PROCEDURE — 700102 HCHG RX REV CODE 250 W/ 637 OVERRIDE(OP): Performed by: NURSE PRACTITIONER

## 2018-12-25 PROCEDURE — 770006 HCHG ROOM/CARE - MED/SURG/GYN SEMI*

## 2018-12-25 PROCEDURE — 700101 HCHG RX REV CODE 250: Performed by: INTERNAL MEDICINE

## 2018-12-25 PROCEDURE — 99232 SBSQ HOSP IP/OBS MODERATE 35: CPT | Performed by: HOSPITALIST

## 2018-12-25 RX ORDER — METRONIDAZOLE 500 MG/1
500 TABLET ORAL EVERY 8 HOURS
Status: COMPLETED | OUTPATIENT
Start: 2018-12-25 | End: 2018-12-26

## 2018-12-25 RX ORDER — HYDRALAZINE HYDROCHLORIDE 10 MG/1
5 TABLET, FILM COATED ORAL EVERY 6 HOURS PRN
Status: DISCONTINUED | OUTPATIENT
Start: 2018-12-25 | End: 2018-12-26 | Stop reason: HOSPADM

## 2018-12-25 RX ORDER — SODIUM CHLORIDE 9 MG/ML
INJECTION, SOLUTION INTRAVENOUS
Status: COMPLETED
Start: 2018-12-25 | End: 2018-12-25

## 2018-12-25 RX ADMIN — METRONIDAZOLE 500 MG: 500 TABLET ORAL at 21:31

## 2018-12-25 RX ADMIN — ENOXAPARIN SODIUM 40 MG: 100 INJECTION SUBCUTANEOUS at 21:32

## 2018-12-25 RX ADMIN — METRONIDAZOLE 500 MG: 500 INJECTION, SOLUTION INTRAVENOUS at 13:50

## 2018-12-25 RX ADMIN — ALLOPURINOL 300 MG: 300 TABLET ORAL at 05:49

## 2018-12-25 RX ADMIN — TRAMADOL HYDROCHLORIDE 100 MG: 50 TABLET, FILM COATED ORAL at 05:49

## 2018-12-25 RX ADMIN — TIOTROPIUM BROMIDE 1 CAPSULE: 18 CAPSULE ORAL; RESPIRATORY (INHALATION) at 05:54

## 2018-12-25 RX ADMIN — GABAPENTIN 200 MG: 100 CAPSULE ORAL at 12:32

## 2018-12-25 RX ADMIN — GABAPENTIN 200 MG: 100 CAPSULE ORAL at 17:00

## 2018-12-25 RX ADMIN — STANDARDIZED SENNA CONCENTRATE AND DOCUSATE SODIUM 2 TABLET: 8.6; 5 TABLET, FILM COATED ORAL at 17:00

## 2018-12-25 RX ADMIN — GABAPENTIN 200 MG: 100 CAPSULE ORAL at 05:50

## 2018-12-25 RX ADMIN — SODIUM CHLORIDE: 9 INJECTION, SOLUTION INTRAVENOUS at 12:45

## 2018-12-25 RX ADMIN — OXYCODONE HYDROCHLORIDE 2.5 MG: 5 TABLET ORAL at 17:00

## 2018-12-25 RX ADMIN — OXYCODONE HYDROCHLORIDE 2.5 MG: 5 TABLET ORAL at 21:31

## 2018-12-25 RX ADMIN — METRONIDAZOLE 500 MG: 500 INJECTION, SOLUTION INTRAVENOUS at 06:35

## 2018-12-25 RX ADMIN — LACTULOSE 30 ML: 20 SOLUTION ORAL at 05:49

## 2018-12-25 RX ADMIN — CEFTRIAXONE SODIUM 2 G: 2 INJECTION, POWDER, FOR SOLUTION INTRAMUSCULAR; INTRAVENOUS at 05:49

## 2018-12-25 RX ADMIN — LEVOTHYROXINE SODIUM 137 MCG: 137 TABLET ORAL at 05:49

## 2018-12-25 RX ADMIN — VALSARTAN 160 MG: 80 TABLET, FILM COATED ORAL at 05:50

## 2018-12-25 ASSESSMENT — ENCOUNTER SYMPTOMS
NERVOUS/ANXIOUS: 0
VOMITING: 0
STRIDOR: 0
NECK PAIN: 0
LOSS OF CONSCIOUSNESS: 0
POLYDIPSIA: 0
CONSTIPATION: 0
FEVER: 0
EYE REDNESS: 0
HEADACHES: 0
DIARRHEA: 0
EYE PAIN: 0
PALPITATIONS: 0
COUGH: 0
HALLUCINATIONS: 0
NAUSEA: 0
SORE THROAT: 0
SEIZURES: 0
SHORTNESS OF BREATH: 0
EYE DISCHARGE: 0
ABDOMINAL PAIN: 0

## 2018-12-25 ASSESSMENT — PAIN SCALES - GENERAL
PAINLEVEL_OUTOF10: 3
PAINLEVEL_OUTOF10: 5
PAINLEVEL_OUTOF10: 7
PAINLEVEL_OUTOF10: 6
PAINLEVEL_OUTOF10: 8

## 2018-12-25 NOTE — PROGRESS NOTES
Surgical Progress Note    Author: Rita Jimenez Date & Time created: 2018   11:24 AM     Interval Events:  Feels well.  Tolerating PO without N/V or abdominal pain. Passing flatus and stool.      ROS  Hemodynamics:  Temp (24hrs), Av.5 °C (97.7 °F), Min:36.4 °C (97.5 °F), Max:36.7 °C (98.1 °F)  Temperature: 36.5 °C (97.7 °F)  Pulse  Av.5  Min: 55  Max: 93   Blood Pressure : 106/53     Respiratory:    Respiration: 16, Pulse Oximetry: 92 %        RUL Breath Sounds: Clear, RML Breath Sounds: Clear, RLL Breath Sounds: Diminished, JANETTE Breath Sounds: Clear, LLL Breath Sounds: Diminished  Neuro:  GCS       Fluids:  No intake or output data in the 24 hours ending 18 1124     Current Diet Order   Procedures   • Diet Order Low Fiber(GI Soft), Hepatic     Physical Exam   Currently on the commode, waiting for CNA assistance to get off.  Alert, oriented, NAD.  Abdomen soft, benign.     Labs:  No results found for this or any previous visit (from the past 24 hour(s)).  Medical Decision Making, by Problem:  Active Hospital Problems    Diagnosis   • Essential hypertension [I10]     Priority: High     ICD-10 transition     • AAA (abdominal aortic aneurysm) (HCC) [I71.4]   • Drug-induced constipation [K59.03]   • Urinary retention [R33.9]   • Ileitis [K52.9]   • Overweight (BMI 25.0-29.9) [E66.3]   • Closed fracture of neck of left femur (HCC) [S72.002A]   • Cirrhosis (HCC) [K74.60]     Plan:  GI symptoms resolved.  Diet as tolerated.  No need for planned FU with General Surgery, Ok for transfer from our standpoint.  Will sign off.     Quality Measures:  Quality-Core Measures

## 2018-12-25 NOTE — CARE PLAN
Problem: Safety  Goal: Will remain free from injury  Outcome: PROGRESSING AS EXPECTED  Safety precautions in place. Call light within reach. Bed is low and in locked position. Hourly rounding in place.     Problem: Discharge Barriers/Planning  Goal: Patient's continuum of care needs will be met  Outcome: PROGRESSING AS EXPECTED  Pt accepted to [facility]. SW assisting with d/c.

## 2018-12-25 NOTE — PROGRESS NOTES
Hospital Medicine Daily Progress Note    Date of Service  12/25/2018    Chief Complaint  73 y.o. female admitted 12/15/2018 with fall.    Hospital Course      73-year old female with past medical history of hypertension, dyslipidemia, cirrhosis who was admitted on December 15, 2018 with mechanical fall and found with left femoral neck fracture. Dr. Alcala with ortho was consulted and patient had left hip hemiarthroplasty 12/16. She had leukocytosis and workup revealed terminal ileum inflammation on CT with RLQ tenderness on exam. She was started on ceftrixone/flagyl and surgery was consulted. He pain and leukocytosis resolved. Her diet was slowly advanced to soft GI diet and doing well. She will be discharged to complete course of ceftriaxone and flagyl for ileitis.             Interval Problem Update  Hemodynamically stable overnight apart from an episode of systolic hypertension 168   Hydralazine as needed ordered  Pain control  Advance bile protocol for constipation  Tolerating GI soft diet    Consultants/Specialty  Ortho  Surgery    Code Status  Full    Disposition  SNF - [facility] tomorrow if tolerates diet  Sutures/Staples out- 10-14 days post operatively  WBAT    Review of Systems  Review of Systems   Constitutional: Negative for fever and malaise/fatigue.   HENT: Negative for congestion and sore throat.    Eyes: Negative for pain, discharge and redness.   Respiratory: Negative for cough, shortness of breath and stridor.    Cardiovascular: Negative for chest pain and palpitations.   Gastrointestinal: Negative for abdominal pain, constipation, diarrhea, nausea and vomiting.   Genitourinary: Negative for dysuria and urgency.   Musculoskeletal: Positive for joint pain (minimal). Negative for neck pain.   Skin: Negative for itching.   Neurological: Negative for seizures, loss of consciousness and headaches.   Endo/Heme/Allergies: Negative for polydipsia.   Psychiatric/Behavioral: Negative for hallucinations. The  patient is not nervous/anxious.         Physical Exam  Temp:  [36.4 °C (97.5 °F)-36.7 °C (98.1 °F)] 36.5 °C (97.7 °F)  Pulse:  [62-81] 62  Resp:  [16] 16  BP: (104-168)/(46-65) 106/53    Physical Exam   Constitutional: She is oriented to person, place, and time. She appears well-developed and well-nourished.   HENT:   Head: Normocephalic.   Mouth/Throat: Oropharynx is clear and moist. No oropharyngeal exudate.   Eyes: Conjunctivae are normal. Right eye exhibits no discharge. Left eye exhibits no discharge.   Cardiovascular: Normal rate and regular rhythm.    Pulmonary/Chest: Effort normal and breath sounds normal. No stridor. She has no wheezes. She has no rales.   Abdominal: Soft. She exhibits no distension. There is no tenderness. There is no rebound and no guarding.   Musculoskeletal: She exhibits no edema.   Left hip with clean dressing in place, mildly tender   Lymphadenopathy:     She has no cervical adenopathy.   Neurological: She is alert and oriented to person, place, and time.   Skin: Skin is warm and dry.   Psychiatric: She has a normal mood and affect. Judgment normal. She is attentive.   Nursing note and vitals reviewed.      Fluids  No intake or output data in the 24 hours ending 12/25/18 1727    Laboratory  Recent Labs      12/23/18   0648  12/24/18   0352   WBC  9.8  10.0   RBC  3.07*  3.25*   HEMOGLOBIN  10.4*  10.9*   HEMATOCRIT  31.5*  33.6*   MCV  102.6*  103.4*   MCH  33.9*  33.5*   MCHC  33.0*  32.4*   RDW  57.4*  58.8*   PLATELETCT  147*  170   MPV  10.8  11.3     Recent Labs      12/23/18   0648  12/24/18   0352   SODIUM  133*  139   POTASSIUM  3.9  3.8   CHLORIDE  102  104   CO2  25  29   GLUCOSE  102*  101*   BUN  12  12   CREATININE  1.00  1.03   CALCIUM  9.0  9.0                   Imaging  US-APPENDIX   Final Result      The appendix is not delineated.      CT-ABDOMEN-PELVIS WITH   Final Result      1.  Thickening of the terminal ileum related to infectious or inflammatory enteritis.  Crohn disease is a consideration. This would be an unusual appearance for ischemic enteritis.   2.  Trace RIGHT pleural effusion   3.  Probable tiny BILATERAL renal cysts   4.  Atherosclerosis   5.  Interval enlargement of abdominal aortic aneurysm measuring 4.2 x 4.7 cm   6.  Appendix not visualized   7.  Prior posterior decompression and instrumented fusion spanning T12-S1               DX-CHEST-PORTABLE (1 VIEW)   Final Result      Mild cardiomegaly. No focal consolidation or pleural effusions.      DX-PELVIS-1 OR 2 VIEWS   Final Result      Satisfactory positioning, in AP view, of new left hip hemiarthroplasty.      DX-PELVIS-1 OR 2 VIEWS   Final Result      1.  LEFT femoral neck fracture with displacement and varus stimulation.   2.  No pelvic fracture demonstrated.      DX-FEMUR-1 VIEW LEFT   Final Result      Acute displaced fracture of the LEFT femoral neck with varus angulation.      DX-CHEST-LIMITED (1 VIEW)   Final Result      No acute cardiopulmonary disease.           Assessment/Plan  * Closed fracture of neck of left femur (HCC)- (present on admission)   Assessment & Plan    S/p repair with Dr. Alcala  PTOT  Pain control      Ileitis   Assessment & Plan    Leukocytosis noted on admission  CXR had mild cardiomegaly. Blood cultures are negative. Urine culture was negative.  She has RLQ tenderness. CT showed inflammation of terminal ileum, appendix not seen. US did not visualized appendix.   Surgery consulted, diet slowly advanced and tolerating  Course of ceftriaxone and flagyl for ileitis      Essential hypertension- (present on admission)   Assessment & Plan    Controlled with current medication regimen.  Monitor.       AAA (abdominal aortic aneurysm) ([facility])   Assessment & Plan    Infrarenal abdominal aortic aneurysm measures up to 4.2 x 4.7 cm, previously 3.8 x 2.8 cm.  F/u imaging as outpatient for monitoring  BP control       Urinary retention   Assessment & Plan    May be contributing to increase  creatinine, improved  Started on flomax  Mendiola cath removed 12/21 and monitor with bladder scans      Overweight (BMI 25.0-29.9)- (present on admission)   Assessment & Plan    Body mass index is 29.76 kg/m².       Cirrhosis (HCC)- (present on admission)   Assessment & Plan    History of  Without evidence of end-stage disease.  Unclear etiology.  Outpatient follow-up       Drug-induced constipation   Assessment & Plan    Scheduled tylenol & Gabapentin   PRN tramadol   Try to minimize narcotic use  Senakot BID and daily miralax            VTE prophylaxis: lovenox

## 2018-12-25 NOTE — CARE PLAN
Problem: Bowel/Gastric:  Goal: Normal bowel function is maintained or improved    Intervention: Educate patient and significant other/support system about diet, fluid intake, medications and activity to promote bowel function  Patient takes lactulose as her stool softener, and refuses senna and miralax. Educated patient on the importance of preventing constipation, and maintaining regular bowel movements with the help of stool softeners and adequate hydration.      Problem: Pain Management  Goal: Pain level will decrease to patient's comfort goal    Intervention: Follow pain managment plan developed in collaboration with patient and Interdisciplinary Team  Patient managing pain with Oxy 2.5mg and tramadol PRN.

## 2018-12-25 NOTE — PROGRESS NOTES
"   Orthopaedic PA Progress Note    Interval changes:Did well, expecting transfer soon. CLeared for same by Ortho. Follow-Up: needs appointment with Dr. Alcala at [facility] at 10-14 days post-op for re-evaluation, staple removal and radiographs.      ROS - Patient denies any new issues. No chest pain, dyspnea, or fever.  Pain well controlled.    Blood pressure 106/53, pulse 62, temperature 36.5 °C (97.7 °F), temperature source Temporal, resp. rate 16, height 1.6 m (5' 3\"), weight 74.7 kg (164 lb 10.9 oz), SpO2 92 %, not currently breastfeeding.    Patient seen and examined  No acute distress  Breathing non labored  RRR  Surgical dressing is clean, dry, and intact. Patient clearly fires tibialis anterior, EHL, and gastrocnemius/soleus. Sensation is intact to light touch throughout superficial peroneal, deep peroneal, tibial, saphenous, and sural nerve distributions. Strong and palpable 2+ dorsalis pedis and posterior tibial pulses with capillary refill less than 2 seconds. No lower leg tenderness or discomfort.    Recent Labs      12/23/18   0648  12/24/18   0352   WBC  9.8  10.0   RBC  3.07*  3.25*   HEMOGLOBIN  10.4*  10.9*   HEMATOCRIT  31.5*  33.6*   MCV  102.6*  103.4*   MCH  33.9*  33.5*   MCHC  33.0*  32.4*   RDW  57.4*  58.8*   PLATELETCT  147*  170   MPV  10.8  11.3       Active Hospital Problems    Diagnosis   • Essential hypertension [I10]     Priority: High     ICD-10 transition     • AAA (abdominal aortic aneurysm) (HCC) [I71.4]   • Drug-induced constipation [K59.03]   • Urinary retention [R33.9]   • Ileitis [K52.9]   • Overweight (BMI 25.0-29.9) [E66.3]   • Closed fracture of neck of left femur (HCC) [S72.002A]   • Cirrhosis (HCC) [K74.60]     Assessment/Plan:  POD#9 S/P L hip hemiarthroplasty  Wt bearing status - AT with PHP  PT/OT-initiated  Wound care:dressing changed, wound clean dry and well approximated  Drains - no  Mendiola-no  Sutures/Staples out- 10-14 days post " operatively  Antibiotics:Rocephin  DVT Prophylaxis- TEDS/SCDs/Foot pumps.   Lovenox:40 mg daily, Duration-until ambulatory > 150'  Future Procedures - pending GenSurg re-eval  Case Coordination for Discharge Planning - Disposition Ready for transfer SNF when cleared by [facility]/Med

## 2018-12-26 VITALS
DIASTOLIC BLOOD PRESSURE: 66 MMHG | HEIGHT: 63 IN | RESPIRATION RATE: 18 BRPM | WEIGHT: 164.68 LBS | HEART RATE: 73 BPM | OXYGEN SATURATION: 93 % | TEMPERATURE: 97.6 F | SYSTOLIC BLOOD PRESSURE: 121 MMHG | BODY MASS INDEX: 29.18 KG/M2

## 2018-12-26 LAB
ANION GAP SERPL CALC-SCNC: 5 MMOL/L (ref 0–11.9)
BUN SERPL-MCNC: 13 MG/DL (ref 8–22)
CALCIUM SERPL-MCNC: 9.1 MG/DL (ref 8.5–10.5)
CHLORIDE SERPL-SCNC: 102 MMOL/L (ref 96–112)
CO2 SERPL-SCNC: 28 MMOL/L (ref 20–33)
CREAT SERPL-MCNC: 0.96 MG/DL (ref 0.5–1.4)
GLUCOSE SERPL-MCNC: 118 MG/DL (ref 65–99)
MAGNESIUM SERPL-MCNC: 1.6 MG/DL (ref 1.5–2.5)
POTASSIUM SERPL-SCNC: 3.8 MMOL/L (ref 3.6–5.5)
SODIUM SERPL-SCNC: 135 MMOL/L (ref 135–145)

## 2018-12-26 PROCEDURE — 700102 HCHG RX REV CODE 250 W/ 637 OVERRIDE(OP): Performed by: INTERNAL MEDICINE

## 2018-12-26 PROCEDURE — 700102 HCHG RX REV CODE 250 W/ 637 OVERRIDE(OP): Performed by: HOSPITALIST

## 2018-12-26 PROCEDURE — 83735 ASSAY OF MAGNESIUM: CPT

## 2018-12-26 PROCEDURE — 80048 BASIC METABOLIC PNL TOTAL CA: CPT

## 2018-12-26 PROCEDURE — A9270 NON-COVERED ITEM OR SERVICE: HCPCS | Performed by: HOSPITALIST

## 2018-12-26 PROCEDURE — 99239 HOSP IP/OBS DSCHRG MGMT >30: CPT | Performed by: HOSPITALIST

## 2018-12-26 PROCEDURE — A9270 NON-COVERED ITEM OR SERVICE: HCPCS | Performed by: INTERNAL MEDICINE

## 2018-12-26 PROCEDURE — 36415 COLL VENOUS BLD VENIPUNCTURE: CPT

## 2018-12-26 RX ORDER — TIOTROPIUM BROMIDE 18 UG/1
18 CAPSULE ORAL; RESPIRATORY (INHALATION) DAILY
Qty: 30 CAP | Refills: 0 | Status: SHIPPED | OUTPATIENT
Start: 2018-12-27

## 2018-12-26 RX ORDER — ALLOPURINOL 300 MG/1
300 TABLET ORAL DAILY
Qty: 30 TAB | Refills: 0 | Status: SHIPPED | OUTPATIENT
Start: 2018-12-27

## 2018-12-26 RX ORDER — BUPROPION HYDROCHLORIDE 75 MG/1
75 TABLET ORAL DAILY
Qty: 30 TAB | Refills: 0 | Status: SHIPPED | OUTPATIENT
Start: 2018-12-27

## 2018-12-26 RX ORDER — GABAPENTIN 100 MG/1
200 CAPSULE ORAL 3 TIMES DAILY
Qty: 30 CAP | Refills: 0 | Status: SHIPPED | OUTPATIENT
Start: 2018-12-26

## 2018-12-26 RX ORDER — ACETAMINOPHEN 325 MG/1
650 TABLET ORAL EVERY 6 HOURS PRN
Qty: 30 TAB | Refills: 0 | Status: SHIPPED | OUTPATIENT
Start: 2018-12-26

## 2018-12-26 RX ORDER — VALSARTAN 160 MG/1
160 TABLET ORAL DAILY
Qty: 30 TAB | Refills: 3 | Status: SHIPPED | OUTPATIENT
Start: 2018-12-27

## 2018-12-26 RX ORDER — TAMSULOSIN HYDROCHLORIDE 0.4 MG/1
0.4 CAPSULE ORAL
Qty: 30 CAP | Refills: 0 | Status: SHIPPED | OUTPATIENT
Start: 2018-12-27

## 2018-12-26 RX ORDER — SPIRONOLACTONE 100 MG/1
100 TABLET, FILM COATED ORAL DAILY
Qty: 30 TAB | Refills: 3 | Status: SHIPPED | OUTPATIENT
Start: 2018-12-27

## 2018-12-26 RX ORDER — LEVOTHYROXINE SODIUM 137 UG/1
137 TABLET ORAL DAILY
Qty: 30 TAB | Refills: 0 | Status: SHIPPED | OUTPATIENT
Start: 2018-12-27

## 2018-12-26 RX ADMIN — LEVOTHYROXINE SODIUM 137 MCG: 137 TABLET ORAL at 05:46

## 2018-12-26 RX ADMIN — GABAPENTIN 200 MG: 100 CAPSULE ORAL at 05:46

## 2018-12-26 RX ADMIN — ALLOPURINOL 300 MG: 300 TABLET ORAL at 05:46

## 2018-12-26 RX ADMIN — GABAPENTIN 200 MG: 100 CAPSULE ORAL at 11:22

## 2018-12-26 RX ADMIN — STANDARDIZED SENNA CONCENTRATE AND DOCUSATE SODIUM 2 TABLET: 8.6; 5 TABLET, FILM COATED ORAL at 05:46

## 2018-12-26 RX ADMIN — TIOTROPIUM BROMIDE 1 CAPSULE: 18 CAPSULE ORAL; RESPIRATORY (INHALATION) at 07:51

## 2018-12-26 RX ADMIN — METRONIDAZOLE 500 MG: 500 TABLET ORAL at 05:42

## 2018-12-26 ASSESSMENT — PAIN SCALES - GENERAL
PAINLEVEL_OUTOF10: 3
PAINLEVEL_OUTOF10: ASSUMED PAIN PRESENT

## 2018-12-26 NOTE — DISCHARGE PLANNING
Anticipated Discharge Disposition: SNF     Action:   Accepted to [facility], transport arranged for 3pm today Met with patient and niece, Jody and they are aware with plan and agreeable. Melvin signed and chart copied and in locked drawer with chart. Bedside RN aware of above.     Barriers to Discharge:  none    Plan: [facility]

## 2018-12-26 NOTE — DISCHARGE SUMMARY
Discharge Summary    CHIEF COMPLAINT ON ADMISSION  Chief Complaint   Patient presents with   • Fall       Reason for Admission  Fall    Admission Date  12/15/2018     CODE STATUS  Full Code    HPI & HOSPITAL COURSE  73-year old female with past medical history of hypertension, dyslipidemia, cirrhosis who was admitted on December 15, 2018 with mechanical fall and found with left femoral neck fracture. Dr. Alcala with ortho was consulted and patient had left hip hemiarthroplasty 12/16. She had leukocytosis and workup revealed terminal ileum inflammation on CT with RLQ tenderness on exam. She was started on ceftrixone/flagyl and general surgery was consulted, no intervention was planned. He pain and leukocytosis resolved. Her diet was slowly advanced to soft GI diet and doing well, general surgery cleared the patient for discharge after she tolerated her soft diet.  Patient was noticed to have urinary retention initially had a catheter that was removed on December 21 and was started on Flomax.  Patient also had constipation related to narcotics that were discontinued.  Patient has been evaluated by speech therapy was recommended to continue GI soft/Dysphagia III with thin liquids, therefore, she is discharged in fair and stable condition to skilled nursing facility.  The patient met 2-midnight criteria for an inpatient stay at the time of discharge.    Discharge Date  12/26/2018      FOLLOW UP ITEMS POST DISCHARGE  Follow-up with orthopedics 10-14 days postoperatively    DISCHARGE DIAGNOSES  Principal Problem:    Closed fracture of neck of left femur (HCC) POA: Yes  Active Problems:    Essential hypertension POA: Yes      Overview: ICD-10 transition    Ileitis POA: No    Cirrhosis (HCC) POA: Yes    Overweight (BMI 25.0-29.9) POA: Yes    Urinary retention POA: No    AAA (abdominal aortic aneurysm) (HCC) POA: Unknown    Drug-induced constipation POA: Clinically Undetermined  Resolved Problems:    Hypokalemia POA: Yes     Corneal abrasion, right POA: No      FOLLOW UP  Follow-up with orthopedics 10-14 days postoperatively   [facility] ([facility])  [address]  [place] [address]  [phone]        Juan Pablo Alcala M.D.  [address] [place] [address]  [phone]    In 1 week        MEDICATIONS ON DISCHARGE     Medication List      START taking these medications      Instructions   acetaminophen 325 MG Tabs  Commonly known as:  TYLENOL   Take 2 Tabs by mouth every 6 hours as needed.  Dose:  650 mg     enoxaparin 40 MG/0.4ML Soln inj  Commonly known as:  LOVENOX   Inject 40 mg as instructed every day.  Dose:  40 mg     tamsulosin 0.4 MG capsule  Start taking on:  12/27/2018  Commonly known as:  FLOMAX   Take 1 Cap by mouth ONE-HALF HOUR AFTER BREAKFAST.  Dose:  0.4 mg        CHANGE how you take these medications      Instructions   allopurinol 300 MG Tabs  Start taking on:  12/27/2018  What changed:  Another medication with the same name was removed. Continue taking this medication, and follow the directions you see here.  Commonly known as:  ZYLOPRIM   Take 1 Tab by mouth every day.  Dose:  300 mg     buPROPion 75 MG Tabs  Start taking on:  12/27/2018  What changed:  Another medication with the same name was removed. Continue taking this medication, and follow the directions you see here.  Commonly known as:  WELLBUTRIN   Take 1 Tab by mouth every day.  Dose:  75 mg     gabapentin 100 MG Caps  What changed:  · medication strength  · how much to take  Commonly known as:  NEURONTIN   Take 2 Caps by mouth 3 times a day.  Dose:  200 mg     levothyroxine 137 MCG Tabs  Start taking on:  12/27/2018  What changed:  how much to take  Commonly known as:  SYNTHROID   Take 1 Tab by mouth every day.  Dose:  137 mcg     tramadol 50 MG Tabs  What changed:  when to take this  Commonly known as:  ULTRAM   Take 1 Tab by mouth every 6 hours as needed (back pain).  Dose:  50 mg     valsartan 160 MG Tabs  Start taking on:   "2018  What changed:  medication strength  Commonly known as:  DIOVAN   Take 1 Tab by mouth every day.  Dose:  160 mg        CONTINUE taking these medications      Instructions   ascorbic acid 500 MG Tabs  Commonly known as:  ascorbic acid   Take 500 mg by mouth every day.  Dose:  500 mg     Coenzyme Q10 200 MG Caps   Take 1 Cap by mouth every day.  Dose:  1 Cap     cyanocobalamin 500 MCG Tabs  Commonly known as:  VITAMIN B-12   Take 2,000 mcg by mouth every day with lunch.  Dose:  2000 mcg     lactulose 20 GM/30ML Soln   Take 30 mL by mouth 3 times a day.  Dose:  30 mL     OCUVITE-LUTEIN Caps   Take 1 Cap by mouth every day.  Dose:  1 Cap     spironolactone 100 MG Tabs  Start taking on:  2018  Commonly known as:  ALDACTONE   Take 1 Tab by mouth every day.  Dose:  100 mg     tiotropium 18 MCG Caps  Start taking on:  2018  Commonly known as:  SPIRIVA   Inhale 1 Cap by mouth every day.  Dose:  18 mcg     VITAMIN D PO   Take 4,000 mg by mouth every day.  Dose:  4000 mg        STOP taking these medications    ALPRAZolam 0.5 MG Tabs  Commonly known as:  XANAX     colchicine 0.6 MG Tabs  Commonly known as:  COLCRYS     furosemide 40 MG Tabs  Commonly known as:  LASIX     magnesium hydroxide 400 MG/5ML Susp  Commonly known as:  MILK OF MAGNESIA         Allergies  Allergies   Allergen Reactions   • Amoxicillin      unsure   • Metoprolol      Pt states she \"\" when she took it.   • Statins [Hmg-Coa-R Inhibitors]      Severe myalgias and elevated CPK.  Extreme weakness and falls   • Tricor      Severe myalgias and elevated CPK.       DIET  Orders Placed This Encounter   Procedures   • Diet Order Low Fiber(GI Soft), Hepatic     Standing Status:   Standing     Number of Occurrences:   1     Order Specific Question:   Diet:     Answer:   Low Fiber(GI Soft) [2]     Order Specific Question:   Diet:     Answer:   Hepatic [9]       ACTIVITY  As tolerated.  Wt bearing status - AT with " PHP    CONSULTATIONS  Orthopedics  General surgery    PROCEDURES  Left hip hemiarthroplasty    LABORATORY  Lab Results   Component Value Date    SODIUM 135 12/26/2018    POTASSIUM 3.8 12/26/2018    CHLORIDE 102 12/26/2018    CO2 28 12/26/2018    GLUCOSE 118 (H) 12/26/2018    BUN 13 12/26/2018    CREATININE 0.96 12/26/2018    CREATININE 1.17 (H) 05/15/2013        Lab Results   Component Value Date    WBC 10.0 12/24/2018    HEMOGLOBIN 10.9 (L) 12/24/2018    HEMATOCRIT 33.6 (L) 12/24/2018    PLATELETCT 170 12/24/2018        Total time of the discharge process exceeds 33 minutes.

## 2018-12-26 NOTE — CARE PLAN
Problem: Safety  Goal: Will remain free from injury  Outcome: PROGRESSING AS EXPECTED  Safety precautions in place. Call light within reach. Bed is low and in locked position. Hourly rounding in place.     Problem: Discharge Barriers/Planning  Goal: Patient's continuum of care needs will be met  Outcome: PROGRESSING AS EXPECTED  Pt to leave at 1500 to [facility] care per SW. MD aware. Waiting d/c orders.

## 2018-12-26 NOTE — DISCHARGE PLANNING
Received Transport Form @ 6049  Spoke to Jessee @ [facility]    Transport is scheduled for 12/26 @1500 going to [place].

## 2018-12-26 NOTE — PROGRESS NOTES
Report given to Maria Isabel STRICKLAND at [facility].

## 2018-12-26 NOTE — CARE PLAN
Problem: Communication  Goal: The ability to communicate needs accurately and effectively will improve  Outcome: MET Date Met: 12/26/18  Patient uses call light as necessary to contact medical staff    Problem: Safety  Goal: Will remain free from falls  Outcome: MET Date Met: 12/26/18  Bed locked in lowest position with top two siderails up, Call light within reach, treaded socks on, in room close to nurses station, educated patient to call for help before getting up

## 2018-12-26 NOTE — DISCHARGE INSTRUCTIONS
Discharge Instructions    Discharged to [facility] by medical transportation with escort. Discharged via wheelchair, hospital escort: Yes.  Special equipment needed: Walker    Be sure to schedule a follow-up appointment with your primary care doctor or any specialists as instructed.     Discharge Plan:   Smoking Cessation Offered: Patient Refused  Pneumococcal Vaccine Administered/Refused: Not given - Patient refused pneumococcal vaccine  Influenza Vaccine Indication: Not indicated: Previously immunized this influenza season and > 8 years of age    I understand that a diet low in cholesterol, fat, and sodium is recommended for good health. Unless I have been given specific instructions below for another diet, I accept this instruction as my diet prescription.   Other diet: GI soft diet    Special Instructions: Discharge instructions for the Orthopedic Patient    Follow up with Primary Care Physician within 2 weeks of discharge to home, regarding:  Review of medications and diagnostic testing.  Surveillance for medical complications.  Workup and treatment of osteoporosis, if appropriate.     -Is this a Joint Replacement patient? Yes   Total Joint Hip Replacement Discharge Instructions    Pain  - The goal is to slowly wean off the prescription pain medicine.  - Ice can be used for pain control.  20 minutes at a time is recommended, and never directly against your skin or incision.  - Most patients are off the pain pills by 3 weeks; others may require a low level of pain medications for many months. If your pain continues to be severe, follow up with your physician.  Infection  Deep hip joint infections that require removal of the prostheses occur in less than 0.1% of patients. Lesser infections in the skin (cellulites) are more common and much more easily treated.  - Keep the incision as clean and dry as possible.  - Always wash your hands before touching your incision.  - Skin infections tend to develop around 7-10  days after surgery, most can be treated with oral antibiotics.  - Dental Care should be delayed for 3 months after surgery, your surgeon recommends taking a dose of antibiotics 1 hour prior to any dental procedure.  After 2 years, most surgeons recommend antibiotics only before an extensive procedure.  Ask your surgeon what he recommends.  - Signs and symptoms of infection can include:  low grade fever, redness, pain, swelling and drainage from your incision.  Notify your surgeon immediately if you develop any of these symptoms.  Post op Disturbances  - Bowel habits - constipation is extremely common and is caused by a combination of anesthesia, lack of mobility and pain medicine.  Use stool softeners or laxatives if necessary. It is important not to ignore this problem, as bowel obstructions can be a serious complication after joint replacement surgery.  - Mood/Energy Level - Many patients experience a lack of energy and endurance for up to 2-3 months after surgery.  Some may also feel down and can even become depressed.  This is likely due to the postoperative anemia, change in activity level, lack of sleep, pain medicine and just the emotional reaction to the surgery itself that is a big disruption in a person’s life.  This usually passes.  If symptoms persist, follow up with your primary physician.  - Returning to work - Your surgeon will give you more specific instructions.  Generally, if you work a sedentary job requiring little standing or walking, most patients may return within 2-6 weeks.  Manual labor jobs involving walking, lifting and standing may take 3-4 months.  Your surgeon’s office can provide a release to part-time or light duty work early on in your recovery and progress you to full duty as able.  - Driving - You can begin driving an automatic shift car in 4 to 8 weeks, provided you are no longer taking narcotic pain medication. If you have a stick-shift car and your right hip was replaced, do not  begin driving until your doctor says you can.   - Avoiding falls -  throw rugs and tack down loose carpeting.  Be aware of floor hazards such as pets, small objects or uneven surfaces.   -  Airport Metal Detectors - The sensitivity of metal detectors varies and it is likely that your prosthesis will cause an alarm. Inform the  that you have an artificial joint.  Diet  - Resume your normal diet as tolerated.  - It is important to achieve a healthy nutritional status by eating a well balanced diet on a regular basis.  - Your physician may recommend that you take iron and vitamin supplements.   - Continue to drink plenty of fluids.  Shower/Bathing  - You may shower as soon as you get home from the hospital unless otherwise instructed.  - Keep your incision out of water.  To keep the incision dry when showering, cover it with a plastic bag or plastic wrap.  - Pat incision dry if it gets wet.  Don’t rub.  - Do not submerge in a bath until staples are out and the incision is completely healed. (Approximately 6-8 weeks after surgery).  Dressing Change:  Procedure (if recommended by your physician)  - Wash hands.  - Open all dressing change materials.  - Remove old dressing and discard.  - Inspect incision for redness, increase in clear drainage, yellow/green drainage, odor and surrounding skin hot to touch.  -  ABD (large gauze) pad by one corner and lay over the incision.  Be careful not to touch the inside of the dressing that will lay over the incision.  - Secure in place as instructed (Ace wrap or tape).    Swelling/Bruising  - Swelling is normal after hip replacement and can involve the thigh, knee, calf and foot.  - Swelling can last from 3-6 months.  - Elevate your leg higher than your heart while reclining.  The first week you are home you should elevate your leg an equal amount of time, as you are active.    - Anti-inflammatory pills can be taken once you have stopped the blood  thinners.  - The swelling is usually worse after you go home since you are upright for longer periods of time.  - Bruising is common and can involve the entire leg including the thigh, calf and even foot.  Bruising often does not appear until after you arrive home and it can be quite dramatic- purple, black, green.  The bruising you can see is not usually concerning and will subside without any treatment.      Blood Clot Prevention  Blood clots in the legs and the less common, but frightening, clots that travel to the lungs are a real focus of our preventative. Most patients are at standard risk for them, but those patients who are at higher risk include people who have had previous clots, a family history of clotting, smoking, diabetes, obesity, advanced age, use of estrogen and a sedentary lifestyle.    - Signs of blood clots in legs - Swelling in thigh, calf or ankle that does not go down with elevation.  Pain, heat and tenderness in calf, back of calf or groin area.  NOTE: blood clots can occur in either leg.  - You have been receiving anticoagulant therapy (blood thinners) in the hospital and you may be instructed to continue at home depending on your risk factors.  - Your risk for developing a clot continues for up to 2-3 months after surgery.  You should avoid prolonged sitting and dehydration during that time (long air trips and car trips).  If you do take a trip during this time, please get up and move around every 1- 1.5 hours.  - If you are prescribed blood thinning medication for home, follow instructions as directed. (Handouts provided if applicable).      Activity    Once you get home, you should stay active. The key is not to overdo it! While you can expect some good days and some bad days, you should notice a gradual improvement over time you should notice a gradual improvement and a gradual increase in your endurance over the next 6 to 12 months.    - Weight Bearing - If you have undergone cemented  or hybrid hip replacement, you can put some weight on the leg immediately using a cane or walker, and you should continue to use some support for 4 to 6 weeks to help the muscles recover.   - Sleeping Positions - Sleep on your back with your legs slightly apart or on your side with a regular pillow between your knees. Be sure to use the pillow for at least 6 weeks, or until your doctor says you can do without it. Sleeping on your stomach should be all right  - Sitting - For at least the first 3 months, sit only in chairs that have arms. Do not sit on low chairs, low stools, or reclining chairs. Do not cross your legs at the knees. The physical therapist will show you how to sit and stand from a chair, keeping your affected leg out in front of you. Get up and move around on a regular basis--at least once every hour.  - Walking - Walk as much as you like once your doctor gives you the go-ahead, but remember that walking is no substitute for your prescribed exercises. Walking with a pair of trekking poles is helpful and adds as much as 40% to the exercise you get when you walk  - Therapy may be needed in some cases, to strengthen your muscles and improve your gait (walking pattern).  This decision will be made at your post-operative appointment.  Follow your therapist recommended post-operative exercises (handout provided by Therapist).  - Swimming is also recommended; you can begin as soon as the sutures have been removed and the wound is healed, approximately 6 to 8 weeks after surgery. Using a pair of training fins may make swimming a more enjoyable and effective exercise.  - Other activities - Lower impact activities are preferred.  If you have specific questions, consult your Surgeon.    - Sexual activity - Your surgeon can tell you when it should be safe to resume sexual activity.      When to Call the Doctor   Call the physician if:   - Fever over 100.5? F  - Increased pain, drainage, redness, odor or heat  around the incision area  - Shaking chills  - Increased knee pain with activity and rest  - Increased pain in calf, tenderness or redness above or below the knee  - Increased swelling of calf, ankle, foot  - Sudden increased shortness of breath, sudden onset of chest pain, localized chest pain with coughing  - Incision opening  Or, if there are any questions or concerns about medications or care.       -Is this patient being discharged with medication to prevent blood clots?  Yes, Lovenox Enoxaparin injection  What is this medicine?  ENOXAPARIN (ee nox a PA rin) is used after knee, hip, or abdominal surgeries to prevent blood clotting. It is also used to treat existing blood clots in the lungs or in the veins.  This medicine may be used for other purposes; ask your health care provider or pharmacist if you have questions.  COMMON BRAND NAME(S): Lovenox  What should I tell my health care provider before I take this medicine?  They need to know if you have any of these conditions:  -bleeding disorders, hemorrhage, or hemophilia  -infection of the heart or heart valves  -kidney or liver disease  -previous stroke  -prosthetic heart valve  -recent surgery or delivery of a baby  -ulcer in the stomach or intestine, diverticulitis, or other bowel disease  -an unusual or allergic reaction to enoxaparin, heparin, pork or pork products, other medicines, foods, dyes, or preservatives  -pregnant or trying to get pregnant  -breast-feeding  How should I use this medicine?  This medicine is for injection under the skin. It is usually given by a health-care professional. You or a family member may be trained on how to give the injections. If you are to give yourself injections, make sure you understand how to use the syringe, measure the dose if necessary, and give the injection. To avoid bruising, do not rub the site where this medicine has been injected. Do not take your medicine more often than directed. Do not stop taking except  on the advice of your doctor or health care professional.  Make sure you receive a puncture-resistant container to dispose of the needles and syringes once you have finished with them. Do not reuse these items. Return the container to your doctor or health care professional for proper disposal.  Talk to your pediatrician regarding the use of this medicine in children. Special care may be needed.  Overdosage: If you think you have taken too much of this medicine contact a poison control center or emergency room at once.  NOTE: This medicine is only for you. Do not share this medicine with others.  What if I miss a dose?  If you miss a dose, take it as soon as you can. If it is almost time for your next dose, take only that dose. Do not take double or extra doses.  What may interact with this medicine?  -aspirin and aspirin-like medicines  -certain medicines that treat or prevent blood clots  -dipyridamole  -NSAIDs, medicines for pain and inflammation, like ibuprofen or naproxen  This list may not describe all possible interactions. Give your health care provider a list of all the medicines, herbs, non-prescription drugs, or dietary supplements you use. Also tell them if you smoke, drink alcohol, or use illegal drugs. Some items may interact with your medicine.  What should I watch for while using this medicine?  Visit your doctor or health care professional for regular checks on your progress. Your condition will be monitored carefully while you are receiving this medicine.  Notify your doctor or health care professional and seek emergency treatment if you develop breathing problems; changes in vision; chest pain; severe, sudden headache; pain, swelling, warmth in the leg; trouble speaking; sudden numbness or weakness of the face, arm, or leg. These can be signs that your condition has gotten worse.  If you are going to have surgery, tell your doctor or health care professional that you are taking this medicine.  Do  not stop taking this medicine without first talking to your doctor. Be sure to refill your prescription before you run out of medicine.  Avoid sports and activities that might cause injury while you are using this medicine. Severe falls or injuries can cause unseen bleeding. Be careful when using sharp tools or knives. Consider using an electric razor. Take special care brushing or flossing your teeth. Report any injuries, bruising, or red spots on the skin to your doctor or health care professional.  What side effects may I notice from receiving this medicine?  Side effects that you should report to your doctor or health care professional as soon as possible:  -allergic reactions like skin rash, itching or hives, swelling of the face, lips, or tongue  -feeling faint or lightheaded, falls  -signs and symptoms of bleeding such as bloody or black, tarry stools; red or dark-brown urine; spitting up blood or brown material that looks like coffee grounds; red spots on the skin; unusual bruising or bleeding from the eye, gums, or nose  Side effects that usually do not require medical attention (report to your doctor or health care professional if they continue or are bothersome):  -pain, redness, or irritation at site where injected  This list may not describe all possible side effects. Call your doctor for medical advice about side effects. You may report side effects to FDA at 3-631-FDA-7429.  Where should I keep my medicine?  Keep out of the reach of children.  Store at room temperature between 15 and 30 degrees C (59 and 86 degrees F). Do not freeze. If your injections have been specially prepared, you may need to store them in the refrigerator. Ask your pharmacist. Throw away any unused medicine after the expiration date.  NOTE: This sheet is a summary. It may not cover all possible information. If you have questions about this medicine, talk to your doctor, pharmacist, or health care provider.  © 2018 [employer] (2015-04-21 16:06:21)      · Is patient discharged on Warfarin / Coumadin?   No           Depression / Suicide Risk    As you are discharged from this [facility] facility, it is important to learn how to keep safe from harming yourself.    Recognize the warning signs:  · Abrupt changes in personality, positive or negative- including increase in energy   · Giving away possessions  · Change in eating patterns- significant weight changes-  positive or negative  · Change in sleeping patterns- unable to sleep or sleeping all the time   · Unwillingness or inability to communicate  · Depression  · Unusual sadness, discouragement and loneliness  · Talk of wanting to die  · Neglect of personal appearance   · Rebelliousness- reckless behavior  · Withdrawal from people/activities they love  · Confusion- inability to concentrate     If you or a loved one observes any of these behaviors or has concerns about self-harm, here's what you can do:  · Talk about it- your feelings and reasons for harming yourself  · Remove any means that you might use to hurt yourself (examples: pills, rope, extension cords, firearm)  · Get professional help from the community (Mental Health, Substance Abuse, psychological counseling)  · Do not be alone:Call your Safe Contact- someone whom you trust who will be there for you.  · Call your local CRISIS HOTLINE 334-6387 or 390-492-8923  · Call your local Children's Mobile Crisis Response Team [place] (353) 443-0113 or www.[facility]  · Call the toll free National Suicide Prevention Hotlines   · National Suicide Prevention Lifeline 703-626-FYNK (3836)  · National Hope Line Network 800-SUICIDE (248-5836)

## 2018-12-27 NOTE — PROGRESS NOTES
Pt discharged to [facility] with medical transport. IV removed. Pt left unit via wheelchair with escort. Personal belongings with pt when leaving unit. Pt given discharge instructions prior to leaving unit including prescription and when to visit with physician; verbalizes understanding. Copy of discharge instructions with pt and in the chart.

## 2019-01-18 ENCOUNTER — TELEPHONE (OUTPATIENT)
Dept: SCHEDULING | Facility: IMAGING CENTER | Age: 74
End: 2019-01-18

## 2021-01-14 DIAGNOSIS — Z23 NEED FOR VACCINATION: ICD-10-CM

## (undated) DEVICE — GLOVE BIOGEL SZ 8 SURGICAL PF LTX - (50PR/BX 4BX/CA)

## (undated) DEVICE — GLOVE BIOGEL PI INDICATOR SZ 8.0 SURGICAL PF LF -(50/BX 4BX/CA)

## (undated) DEVICE — CANISTER SUCTION 3000ML MECHANICAL FILTER AUTO SHUTOFF MEDI-VAC NONSTERILE LF DISP  (40EA/CA)

## (undated) DEVICE — SENSOR SPO2 NEO LNCS ADHESIVE (20/BX) SEE USER NOTES

## (undated) DEVICE — LENS/HOOD FOR SPACESUIT - (32/PK) PEEL AWAY FACE

## (undated) DEVICE — MASK, LARYNGEAL AIRWAY #4

## (undated) DEVICE — GLOVE SZ 7.5 BIOGEL PI MICRO - PF LF (50PR/BX)

## (undated) DEVICE — TRAY FOLEY CATHETER STATLOCK 16FR SURESTEP  (10EA/CA)

## (undated) DEVICE — GLOVE BIOGEL SZ 6.5 SURGICAL PF LTX (50PR/BX 4BX/CA)

## (undated) DEVICE — GOWN WARMING STANDARD FLEX - (30/CA)

## (undated) DEVICE — NEPTUNE 4 PORT MANIFOLD - (20/PK)

## (undated) DEVICE — GLOVE BIOGEL PI ORTHO SZ 7 PF LF (40PR/BX)

## (undated) DEVICE — ELECTRODE DUAL RETURN W/ CORD - (50/PK)

## (undated) DEVICE — TUBING CLEARLINK DUO-VENT - C-FLO (48EA/CA)

## (undated) DEVICE — BLADE SAGITTAL SAW DUAL CUT 75.0 X 25.0MM (1/EA)

## (undated) DEVICE — PAD UNIVERSAL MULTI USE (1/EA)

## (undated) DEVICE — PROTECTOR ULNA NERVE - (36PR/CA)

## (undated) DEVICE — SUTURE 5 TI-CRON HOS-14 - (36/BX)

## (undated) DEVICE — TOWELS CLOTH SURGICAL - (4/PK 20PK/CA)

## (undated) DEVICE — SLEEVE, VASO, THIGH, MED

## (undated) DEVICE — SODIUM CHL. IRRIGATION 0.9% 3000ML (4EA/CA 65CA/PF)

## (undated) DEVICE — GLOVE BIOGEL SZ 7 SURGICAL PF LTX - (50PR/BX 4BX/CA)

## (undated) DEVICE — HANDPIECE 10FT INTPLS SCT PLS IRRIGATION HAND CONTROL SET (6/PK)

## (undated) DEVICE — GLOVE BIOGEL SZ 7.5 SURGICAL PF LTX - (50PR/BX 4BX/CA)

## (undated) DEVICE — GLOVE BIOGEL INDICATOR SZ 8 SURGICAL PF LTX - (50/BX 4BX/CA)

## (undated) DEVICE — KIT ROOM DECONTAMINATION

## (undated) DEVICE — SODIUM CHL IRRIGATION 0.9% 1000ML (12EA/CA)

## (undated) DEVICE — TIP INTPLS HFLO ML ORFC BTRY - (12/CS)  FOR SURGILAV

## (undated) DEVICE — DRAPE STRLE REG TOWEL 18X24 - (10/BX 4BX/CA)"

## (undated) DEVICE — SUTURE 2-0 VICRYL PLUS CT-1 - 8 X 18 INCH(12/BX)

## (undated) DEVICE — SUTURE GENERAL

## (undated) DEVICE — KIT ANESTHESIA W/CIRCUIT & 3/LT BAG W/FILTER (20EA/CA)

## (undated) DEVICE — LACTATED RINGERS INJ 1000 ML - (14EA/CA 60CA/PF)

## (undated) DEVICE — HEAD HOLDER JUNIOR/ADULT

## (undated) DEVICE — SUCTION INSTRUMENT YANKAUER BULBOUS TIP W/O VENT (50EA/CA)

## (undated) DEVICE — SUTURE 1 VICRYL PLUS CTX - 8 X 18 INCH (12/BX)

## (undated) DEVICE — KIT HIP PREP IM ENCHANCE TOTAL (5EA/BX)

## (undated) DEVICE — PACK TOTAL HIP - (1/CA)

## (undated) DEVICE — CATHETER URETHRAL FOLEY SILICONE OD16 FR 10 ML (10EA/CA)

## (undated) DEVICE — ELECTRODE 850 FOAM ADHESIVE - HYDROGEL RADIOTRNSPRNT (50/PK)

## (undated) DEVICE — SET LEADWIRE 5 LEAD BEDSIDE DISPOSABLE ECG (1SET OF 5/EA)

## (undated) DEVICE — GLOVE BIOGEL INDICATOR SZ 7.5 SURGICAL PF LTX - (50PR/BX 4BX/CA)

## (undated) DEVICE — CHLORAPREP 26 ML APPLICATOR - ORANGE TINT(25/CA)

## (undated) DEVICE — DRESSING POST OP BORDER 4 X 10 (5EA/BX)

## (undated) DEVICE — SET EXTENSION WITH 2 PORTS (48EA/CA) ***PART #2C8610 IS A SUBSTITUTE*****

## (undated) DEVICE — MASK ANESTHESIA ADULT  - (100/CA)